# Patient Record
Sex: FEMALE | Race: WHITE | URBAN - METROPOLITAN AREA
[De-identification: names, ages, dates, MRNs, and addresses within clinical notes are randomized per-mention and may not be internally consistent; named-entity substitution may affect disease eponyms.]

---

## 2021-03-18 ENCOUNTER — IMMUNIZATION (OUTPATIENT)
Dept: PRIMARY CARE CLINIC | Age: 56
End: 2021-03-18
Payer: MEDICAID

## 2021-03-18 PROCEDURE — 91301 COVID-19, MODERNA VACCINE 100MCG/0.5ML DOSE: CPT | Performed by: NURSE PRACTITIONER

## 2021-03-18 PROCEDURE — 0011A COVID-19, MODERNA VACCINE 100MCG/0.5ML DOSE: CPT | Performed by: NURSE PRACTITIONER

## 2021-04-15 ENCOUNTER — IMMUNIZATION (OUTPATIENT)
Dept: PRIMARY CARE CLINIC | Age: 56
End: 2021-04-15
Payer: MEDICAID

## 2021-04-15 PROCEDURE — 0012A COVID-19, MODERNA VACCINE 100MCG/0.5ML DOSE: CPT | Performed by: NURSE PRACTITIONER

## 2021-04-15 PROCEDURE — 91301 COVID-19, MODERNA VACCINE 100MCG/0.5ML DOSE: CPT | Performed by: NURSE PRACTITIONER

## 2022-01-05 ENCOUNTER — IMMUNIZATION (OUTPATIENT)
Dept: PRIMARY CARE CLINIC | Age: 57
End: 2022-01-05
Payer: MEDICAID

## 2022-01-05 PROCEDURE — 0064A COVID-19, MODERNA BOOSTER VACCINE 0.25ML DOSE: CPT | Performed by: PHYSICIAN ASSISTANT

## 2022-01-05 PROCEDURE — 91306 COVID-19, MODERNA BOOSTER VACCINE 0.25ML DOSE: CPT | Performed by: PHYSICIAN ASSISTANT

## 2022-12-09 ENCOUNTER — TELEPHONE (OUTPATIENT)
Dept: CASE MANAGEMENT | Age: 57
End: 2022-12-09

## 2022-12-09 NOTE — TELEPHONE ENCOUNTER
I called the patient and she confirmed her CT lung screening at [address] [place] [address] on 12/11/2022 at 8:00 am.  I reminded the patient to arrive at 7:30 am, enter through the main entrance, and register. Patient confirmed.         Electronically signed by Reggie Palmer on 12/9/22 at 3:36 PM EST

## 2022-12-11 ENCOUNTER — HOSPITAL ENCOUNTER (OUTPATIENT)
Dept: CT IMAGING | Age: 57
Discharge: HOME | End: 2022-12-11
Payer: MEDICAID

## 2022-12-11 DIAGNOSIS — Z87.891 PERSONAL HISTORY OF TOBACCO USE: ICD-10-CM

## 2022-12-11 PROCEDURE — 71271 CT THORAX LUNG CANCER SCR C-: CPT

## 2022-12-13 ENCOUNTER — TELEPHONE (OUTPATIENT)
Dept: GENERAL RADIOLOGY | Age: 57
End: 2022-12-13

## 2022-12-13 NOTE — TELEPHONE ENCOUNTER
No call, encounter opened to process CT Lung Screening. CT Lung Screen: 12/11/2022    Impression   Bilateral 2 mm pulmonary nodules. See below recommendations for   cross-sectional imaging follow-up per lung rads criteria. Asymmetric enlargement of the left lobe of the thyroid gland, raise the   possibility of a in isodense nodule. Correlation with thyroid ultrasound is   recommended. Bilateral top-normal sized axillary lymph nodes measuring up to 8 mm in short   axis. Clinical correlation recommended. Correlation with mammography is   also recommended. LUNG RADS:   Per ACR Lung-RADS Version 1.1       Category 2, Benign appearance or behavior. Management:  Continue annual lung screening with LDCT in 12 months. RECOMMENDATIONS:   If you would like to [facility] your patient [facility], please contact the Nurse Navigator at   [phone]. Pack years: 27    Social History     Tobacco Use  Smoking Status: Current Every Day Smoker    Start Date:    Quit Date:    Types: Cigarettes   Packs/Day: 1   Years: 30   Pack Years: 27   Smokeless Tobacco: Never         Results letter sent to patient via my chart or mailed.      [facility]

## 2023-01-10 PROBLEM — Z87.891 PERSONAL HISTORY OF TOBACCO USE: Status: ACTIVE | Noted: 2023-01-10

## 2023-01-10 PROBLEM — E04.1 THYROID NODULE: Status: ACTIVE | Noted: 2023-01-10

## 2023-01-18 ENCOUNTER — HOSPITAL ENCOUNTER (OUTPATIENT)
Dept: ULTRASOUND IMAGING | Age: 58
Discharge: HOME | End: 2023-01-18
Payer: MEDICAID

## 2023-01-18 DIAGNOSIS — E04.1 THYROID NODULE: ICD-10-CM

## 2023-01-18 PROCEDURE — 88305 TISSUE EXAM BY PATHOLOGIST: CPT

## 2023-01-18 PROCEDURE — 10005 FNA BX W/US GDN 1ST LES: CPT

## 2023-01-18 PROCEDURE — 88173 CYTOPATH EVAL FNA REPORT: CPT

## 2023-01-20 ENCOUNTER — HOSPITAL ENCOUNTER (OUTPATIENT)
Dept: MAMMOGRAPHY | Age: 58
Discharge: HOME | End: 2023-01-20
Payer: MEDICAID

## 2023-01-20 DIAGNOSIS — Z12.31 ENCOUNTER FOR SCREENING MAMMOGRAM FOR BREAST CANCER: ICD-10-CM

## 2023-01-20 PROCEDURE — 77067 SCR MAMMO BI INCL CAD: CPT

## 2023-01-30 NOTE — PROGRESS NOTES
Dear Dr Bernadine Sorensen, [place], [place]     We had the pleasure of seeing Wojciech Andrew, 1965 here    on 2/2/2023  Please see below for review of care and plans. Chief complaint-     ICD-10-CM    1. 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 First noted on a CT lung on 12/11/22. Thyroid US and FNA in chart 12/15/22 and 1/18/23. No pain, just has soreness in neck since biopsy and tenderness in Rt shoulder. Mentions coughing up a lot of mucous and has frequent Lt sided sinus pressure and wooshing in head/ears. No difficulty swallowing. Froggy voice and seasonally loses voice. Drinks water throughout the day, 2 cups of coffee, and occasionally alcohol. Weight has been stable recently, but lost 33 lbs since July with diet change. - hx of radiation exposure. + family hx of thyroid disease- cousin with benign, no cancers in family      Review of Systems- No drainage, discharge, or headache. Complete 10 system ROS completed and negative except as noted above. Physical Examination-   Vital Signs-/81   Pulse 68   Ht 5' 3\" (1.6 m)   Wt 182 lb 9.6 oz (82.8 kg)   BMI 32.35 kg/m²     Ears- Tympanic membranes clear bilaterally. No middle ear effusion. No pre or post auricular tenderness. Nose- Nasal mucosa clear and dry. No significant septal deviation or inferior turbinate hypertrophy. Oral Cavity/Oropharynx- Floor of mouth and tongue are soft and nontender. No posterior pharyngeal erythema. + gag reflex  Neck- Soft and nontender. No masses, lesions, lymphadenopathy, or thyroid nodules appreciated. Cranial Nerve- Cranial nerves II to XII intact. Extraocular muscles intact. No gross motor visual deficits. No spontaneous nystagmus. Face- No facial skin tenderness to palpation. Heart- No cyanosis, regular  Lungs- No stridor, no intercostal accessory muscle use  General- The patient is in no acute distress. A&O x3    Scope  Procedure note- after pt verbally consented, was sprayed nasally with 1:1 neosynephrine and xylocaine. Scope was passed and found nasal cavity with no lesion. np clear, tonsil wnl, tongue mobile and no masses, vocal cords mobile bilaterally with full ab and ad duction except for a round polyp looking lesion on left ant third of vocal cord true. Hypopharynx clear, open and no masses. Medical Decision Making and Treatment Plan. Plan at this time for her thyroid disease was to   1. D/w pt thyroid anatomy, disease and cancer   2. D/w pt treatment options of thyroidectomy    3. R/B/A of surgery discussed with pt. Pt understood, consent signed, and would like to proceed to surgery. No personal or family history of bleeding or adverse reaction to anesthesia. 4.   Plan at this time to treat vocal cord nodule   1. Speech therapy   2. Instructed on good voice use   3. Scope in office- done   4. Recommend excision in OR at time of thyroidectomy      I spent 65 minutes with the patients care and >50% of this time on counseling or coordinating care in this cancer patient who needs major cancer surgery        Thank you for the opportunity to take part in the care of this very pleasant patient, Nichelle Burns  Sincerely,            Reymundo Montemayor.  Erik Qureshi M.D., Ph.D., [address] [facility]   Department of Otolaryngology-Head and Neck Surgery  Chief of Head & Neck Surgical Oncology  Director Head & Neck Oncology Service Line

## 2023-02-02 ENCOUNTER — OFFICE VISIT (OUTPATIENT)
Dept: ENT CLINIC | Age: 58
End: 2023-02-02
Payer: MEDICAID

## 2023-02-02 VITALS
DIASTOLIC BLOOD PRESSURE: 81 MMHG | SYSTOLIC BLOOD PRESSURE: 127 MMHG | HEART RATE: 68 BPM | HEIGHT: 63 IN | WEIGHT: 182.6 LBS | BODY MASS INDEX: 32.36 KG/M2

## 2023-02-02 DIAGNOSIS — Q31.8 VOCAL CORD ANOMALY: ICD-10-CM

## 2023-02-02 DIAGNOSIS — R49.0 HOARSENESS OF VOICE: ICD-10-CM

## 2023-02-02 DIAGNOSIS — J38.1 VOCAL CORD POLYP: ICD-10-CM

## 2023-02-02 DIAGNOSIS — C73 THYROID CANCER (HCC): Primary | ICD-10-CM

## 2023-02-02 PROCEDURE — 99205 OFFICE O/P NEW HI 60 MIN: CPT | Performed by: OTOLARYNGOLOGY

## 2023-02-02 PROCEDURE — 31575 DIAGNOSTIC LARYNGOSCOPY: CPT | Performed by: OTOLARYNGOLOGY

## 2023-02-02 PROCEDURE — 3017F COLORECTAL CA SCREEN DOC REV: CPT | Performed by: OTOLARYNGOLOGY

## 2023-02-02 PROCEDURE — 4004F PT TOBACCO SCREEN RCVD TLK: CPT | Performed by: OTOLARYNGOLOGY

## 2023-02-02 PROCEDURE — G8482 FLU IMMUNIZE ORDER/ADMIN: HCPCS | Performed by: OTOLARYNGOLOGY

## 2023-02-02 PROCEDURE — G8417 CALC BMI ABV UP PARAM F/U: HCPCS | Performed by: OTOLARYNGOLOGY

## 2023-02-02 PROCEDURE — G8427 DOCREV CUR MEDS BY ELIG CLIN: HCPCS | Performed by: OTOLARYNGOLOGY

## 2023-02-02 NOTE — PATIENT INSTRUCTIONS
Calcium 500 mg.   2 tablets 3 times a day for the first week. 2 tablets 2 times a day for the next week then   2 tablets 1 time a day for the last week    Post thyroid/parathyroid surgery instructions:      Calcium Vit D   2 tabs 3 times a day for 1 week                            2 tabs twice a day for 1 week                            2 tabs once a day for 1 week    Can be a tablet or a chocolate chew     SURGERY:_____/_____/_____    Nothing to eat or drink after midnight the night before surgery unless surgery is at [address] [place] [address] or otherwise instructed by the hospital.    DO NOT TAKE ANY ASPIRIN PRODUCTS 7 days prior to surgery. Tylenol only. No Advil, Motrin, Aleve, or Ibuprofen. IF YOU ARE ON BLOOD THINNERS (PLAVIX, COUMADIN, ELIQUIS ETC) THESE WILL ALSO NEED TO BE HELD. Any illegal drugs in your system (including Marijuana even if legally prescribed) will result in your surgery being cancelled. Please be sure to check with our office or the hospital on time frame for the drugs to be out of your system. SHOULD YOUR INSURANCE CHANGE AT ANY TIME YOU MUST CONTACT OUR OFFICE. FAILURE TO DO SO MAY RESULT IN YOUR SURGERY BEING RESCHEDULED OR YOU MAY BE CHARGED AS SELF-PAY. Due to the high demand for surgery at our practice, if you cancel or reschedule your surgery two (2) times we may not reschedule you. If you need FMLA or Short Term Disability paperwork completed for your surgery, please complete your portion, ensure your name and date of birth are on them and fax them to [phone] asap. Paperwork can take up to 2 weeks to be completed. If you have any questions or concerns regarding your surgery, please contact the Surgery SchedulerJuan Jose Peñaloza at [phone] option 2. If you need medical clearance, you are responsible to contact your physician(s) to schedule an appointment for clearance. If clearance is not completed within 30 days of your surgery it may be cancelled.  Our office will fax the appropriate forms that need to be completed to your physician(s). The location of your surgery will call you the day prior to your surgery date to let you know what time you have to be there and any other details. (they usually don't call until late afternoon- early evening.)- IF YOU HAVE QUESTIONS REGARDING THE TIME OF YOUR SURGERY, PLEASE CALL THE FACILITY YOU ARE SCHEDULED AT. [address] [facility], [facility] [place] will call you a couple days prior to surgery and give you further instructions, if you have any questions, you can reach them at [phone] (per Pre-Admission testing, EKG is required for all patients age 53+, have a diagnosis of hypertension, diabetes, or on dialysis). [facility] [address], [place] will call you a couple days prior to surgery and give you further instructions, if you have any questions, you can reach them at [phone] (per Pre-Admission testing, EKG is required for all patients age 53+, have a diagnosis of hypertension, diabetes, or on dialysis). [address] [facility] [place]  will call you a couple days prior to surgery and give you further instructions, if you have any questions, you can reach them at [phone] (per Pre-Admission testing, EKG is required for all patients age 53+, have a diagnosis of hypertension, diabetes, or on dialysis). [facility]), [address],  [facility], [address] will call you a couple days prior to surgery and give you further instructions, if you have any questions, you can reach them at [phone]      Pre-Surgery/Anesthesia Video ([facility] ONLY)  Located on [address]:  1. Scroll over Health Information  2. Select Audio and Video  3. Select [employer]  4. Select Your child and Anesthesia  5.  Select Pre surgery Tour-[place]    FOOD RESTRICTIONS--[facility] ONLY  Solid Food/Milk Products --------- Stop 8 hours prior to Surgery  Formula --------- Stop 6 hours prior to Surgery  Breast Milk ------- Stop 4 hours prior to Surgery  Clear liquids (water, Gatorade, Pedialyte) - Stop 2 hours prior to Surgery

## 2023-02-02 NOTE — LETTER
Dear Dr Royer Collier, DO      We had the pleasure of seeing Gena Crews, 1965 here    on 2/2/2023  Please see below for review of care and plans. Chief complaint-     ICD-10-CM    1. 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 First noted on a CT lung on 12/11/22. Thyroid US and FNA in chart 12/15/22 and 1/18/23. No pain, just has soreness in neck since biopsy and tenderness in Rt shoulder. Mentions coughing up a lot of mucous and has frequent Lt sided sinus pressure and wooshing in head/ears. No difficulty swallowing. Froggy voice and seasonally loses voice. Drinks water throughout the day, 2 cups of coffee, and occasionally alcohol. Weight has been stable recently, but lost 33 lbs since July with diet change. - hx of radiation exposure. + family hx of thyroid disease- cousin with benign, no cancers in family      Review of Systems- No drainage, discharge, or headache. Complete 10 system ROS completed and negative except as noted above. Physical Examination-   Vital Signs-/81   Pulse 68   Ht 5' 3\" (1.6 m)   Wt 182 lb 9.6 oz (82.8 kg)   BMI 32.35 kg/m²     Ears- Tympanic membranes clear bilaterally. No middle ear effusion. No pre or post auricular tenderness. Nose- Nasal mucosa clear and dry. No significant septal deviation or inferior turbinate hypertrophy. Oral Cavity/Oropharynx- Floor of mouth and tongue are soft and nontender. No posterior pharyngeal erythema. + gag reflex  Neck- Soft and nontender. No masses, lesions, lymphadenopathy, or thyroid nodules appreciated. Cranial Nerve- Cranial nerves II to XII intact. Extraocular muscles intact. No gross motor visual deficits. No spontaneous nystagmus.     Face- No facial skin tenderness to palpation. Heart- No cyanosis, regular  Lungs- No stridor, no intercostal accessory muscle use  General- The patient is in no acute distress. A&O x3    Scope  Procedure note- after pt verbally consented, was sprayed nasally with 1:1 neosynephrine and xylocaine. Scope was passed and found nasal cavity with no lesion. np clear, tonsil wnl, tongue mobile and no masses, vocal cords mobile bilaterally with full ab and ad duction except for a round polyp looking lesion on left ant third of vocal cord true. Hypopharynx clear, open and no masses. Medical Decision Making and Treatment Plan. Plan at this time for her thyroid disease was to   1. D/w pt thyroid anatomy, disease and cancer   2. D/w pt treatment options of thyroidectomy    3. R/B/A of surgery discussed with pt. Pt understood, consent signed, and would like to proceed to surgery. No personal or family history of bleeding or adverse reaction to anesthesia. 4.   Plan at this time to treat vocal cord nodule   1. Speech therapy   2. Instructed on good voice use   3. Scope in office- done   4. Recommend excision in OR at time of thyroidectomy      I spent 65 minutes with the patients care and >50% of this time on counseling or coordinating care in this cancer patient who needs major cancer surgery        Thank you for the opportunity to take part in the care of this very pleasant patient, Nikkoines Villa  Sincerely,            Irvin Kerr.  Marco Zapata M.D., Ph.D., [address] [facility]   Department of Otolaryngology-Head and Neck Surgery  Chief of Head & Neck Surgical Oncology  Director Head & Neck Oncology Service Line

## 2023-02-10 ENCOUNTER — TELEPHONE (OUTPATIENT)
Dept: ENT CLINIC | Age: 58
End: 2023-02-10

## 2023-02-10 NOTE — TELEPHONE ENCOUNTER
Pt called and left a message that she wanted to talk to someone about surgery can call her back [phone]

## 2023-02-10 NOTE — TELEPHONE ENCOUNTER
Called her back and informed er that they will call her with a surgery date and then tell you when to start calcium

## 2023-02-16 ENCOUNTER — EVALUATION (OUTPATIENT)
Dept: SPEECH THERAPY | Age: 58
End: 2023-02-16
Payer: MEDICAID

## 2023-02-16 DIAGNOSIS — R49.0 HOARSENESS: Primary | ICD-10-CM

## 2023-02-16 PROCEDURE — 92524 BEHAVRAL QUALIT ANALYS VOICE: CPT | Performed by: SPEECH-LANGUAGE PATHOLOGIST

## 2023-02-24 ENCOUNTER — TREATMENT (OUTPATIENT)
Dept: SPEECH THERAPY | Age: 58
End: 2023-02-24
Payer: MEDICAID

## 2023-02-24 ENCOUNTER — PREP FOR PROCEDURE (OUTPATIENT)
Dept: ENT CLINIC | Age: 58
End: 2023-02-24

## 2023-02-24 ENCOUNTER — TELEPHONE (OUTPATIENT)
Dept: ENT CLINIC | Age: 58
End: 2023-02-24

## 2023-02-24 DIAGNOSIS — R49.0 HOARSENESS: Primary | ICD-10-CM

## 2023-02-24 PROCEDURE — 92507 TX SP LANG VOICE COMM INDIV: CPT | Performed by: SPEECH-LANGUAGE PATHOLOGIST

## 2023-02-24 NOTE — TELEPHONE ENCOUNTER
Prior Authorization Form:      DEMOGRAPHICS:                     Patient Name:  Nichelle Burns  Patient :  1965            Insurance:  Payor: Siomara Fat / Plan: Ramona Hu / Product Type: *No Product type* /   Insurance ID Number:    Payer/Plan Subscr  Sex Relation Sub. Ins. ID Effective Group Num   1.  [facility] 1965 Female Self [id number]                                   [address] [address]         DIAGNOSIS & PROCEDURE:                       Procedure/Operation:  VOCAL CORD POLYP EXCISION, TOTAL THYROIDECTOMY,           CPT Code: 56939, 51181    Diagnosis:  THYROID CANCER, VOCAL CORD POLYP, THYROID NODULE    ICD10 Code: O90, E04.1, J38.1    Location:  [facility] [place] [facility]    Surgeon:  DR. Elmore Part INFORMATION:                          Date: 3/13/23    Time: N/A              Anesthesia:  General                                                       Status:  Outpatient        Special Comments:  NERVE INTEGRITY, MONITOR, PATHOLOGY      Electronically signed by Daysi Newman MA on 2023 at 11:22 AM

## 2023-03-01 NOTE — PROGRESS NOTES
Patient seen for 30 minute in person session this date. Patient reports that she is having her surgery on 03/13/23. We discussed need to change her vocally abusive behaviors so that the nodule does not return after surgery. She expressed understanding. Today, she reports that she has increased her water intake and is still no longer smoking. We worked on all vocal function exercises and after practice, she was able to complete the tasks with fair/fair+ performance with min/mod cues. She was instructed on the initial tasks for easy onset speech in CV combos and words only. She was able to demonstrate understanding and a practice schedule as discussed and agreed upon. Will continue. Guy Horne.  Kirt Shelby MA/CCC-SLP  [id number]  CPT 40551

## 2023-03-01 NOTE — PROGRESS NOTES
[facility]  Outpatient Speech Therapy  Phone: [phone]   Fax:  [phone]    SPEECH/LANGUAGE PATHOLOGY  VOICE EVALUATION and PLAN OF CARE          PATIENT NAME:  Jeanine Segovia  (female)     MRN:  [id number]    :  1965  (61 y.o.)  STATUS:  Outpatient clinic   TODAY'S DATE: 2023  REFERRING PROVIDER:    Dr. Len Pavon       PROVIDER NPI:  [id number]  SPECIFIC PROVIDER ORDER: [facility]-Speech Therapy  Date of order:  2023  EVALUATING THERAPIST: KEENA Wheatley    CERTIFICATION/RECERTIFICATION PERIOD: 2023 to 5/15/23  INSURANCE PROVIDER:  Payor: Norman Palmer / Plan: Melinda Wilkinson / Product Type: *No Product type* /    INSURANCE ID:  [id number] - (Medicaid Managed)   SECONDARY INSURANCE (if applicable):      CPT Codes  EVALUATION: 74043  behavioral quality analysis of voice      TREATMENT:  Requesting treatment authorization for  12 visits over 12 weeks focusing on the following CPT codes:      66027 Speech/Language Therapy     30-45 Minutes    REFERRING/TREATMENT DIAGNOSIS: Vocal cord polyp [J38.1]; Hoarseness of voice [R49.0]       SPEECH THERAPY  PLAN OF CARE   The speech therapy  POC is established based on physician order, speech pathology diagnosis and results of clinical assessment     SPEECH PATHOLOGY DIAGNOSIS:    mild-moderate voice disorder    Outpatient Speech Pathology intervention is recommended 1 time  per week for the above certification period. Conditions Requiring Skilled Therapeutic Intervention for voice  Voice impairment    Specific Speech Therapy Interventions to Include:   Voice therapy    Specific instructions for next treatment:      To initiate POC  To initiate therapeutic exercises    SHORT/LONG TERM GOALS   Patient will complete vocal function exercises (maximum sustained phonation, pitch glides, etc) to achieve appropriate and habitual phonatory function achieving 90% accuracy with minimal verbal prompts. Patient will maintain good vocal hygiene and demonstrate good understanding of behaviors that are damaging to the voice by parent and SLP report with 90% accuracy. Patient will eliminate vocal overuse to improve health of vocal folds by reducing or eliminating trauma to vocal tissues within 4 weeks as evidenced by patient report and SLP observations with 100% accuracy  Patient will complete breath support exercises (abdominal breathing, expiratory muscle training, etc) to improve respiratory functional with voicing achieving 90% accuracy with minimal verbal prompts. Patient will independently use easy onset/yawn sigh techniques to maintain production of voice without hard glottal attacks and in order to reduce tension on the vocal folds. Patient stated goals: Agreed with above,     Rehabilitation Potential/Prognosis: good     VOICE INFORMATION:    Difficulties reported: hoarseness; Clark Triana saw a doctor for a physical recently. She was found to have a vocal cord nodule. She also had a thyroid biopsy and was diagnosed with thyroid cancer. She is scheduled soon for surgery for thyroid removal.  At the time of this surgery, plans are to have the nodule removed as well. She reports medium to high stress levels. 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 Date of initial voice problem:  approx 1 year - she reports that she has had the hoarseness for this time period but was not diagnosed with the nodule until she saw the ENT specialist on 02/02/2023. He diagnosed a round polyp looking lesion on the left anterior third of the true vocal cord.      Vocally abusive behaviors reported:  Throat Clearing, Coughing , Drinking coffee, drinking soda with caffeine, history of smoking but recently quit cold turkey at time of cancer diagnosis, Excessive talking, occasional intake of alcohol, frequent heartburn, and Allergies      Vocally abusive environments exposed to: Dry air and Noisy rooms      Injuries or operations involving the face, head, neck or chest: none    ORAL PERIPHERAL EXAMINATION:      Adequate lingual/labial strength   Dentition:  natural     Tension sites: face and neck    RESPIRATION:   Type of breathing observed:  [] clavicular [x] thoracic [] abdominal      Breath control:    Counts on 1 breath: 18    Sustains /s/: 8.19 seconds    Sustains /z/: 8.54 seconds    S/Z ratio: .86    Sustains /ee/: 8.36 seconds    Sustains /ah/: 8.17 seconds    VOICE PARAMETERS:   PITCH    Pitch Discrimination: fair    Pitch matching:  fair    Overall pitch:  fair    Vocal inflection: fair    Pitch breaks:  fair    INTENSITY    Vocal intensity:  normal    RATE  Speech rate: normal     QUALITY    Vocal quality: breathy and hoarse    RESONANCE    Nasal resonance:   normal       EDUCATION:   The Speech Language Pathologist (SLP) completed education regarding results of evaluation and that intervention is warranted at this time. Learner: Patient  Education: Reviewed results and recommendations of this evaluation  Evaluation of Education:  Sandy Leo understanding    This plan may be re-evaluated and revised as warranted. Treatment goals discussed with Patient   The Patient understand(s) the diagnosis, prognosis and plan of care     Evaluation Time includes thorough review of current medical information, gathering information on past medical history/social history and prior level of function, completion of standardized testing/informal observation of tasks, assessment of data and education on plan of care and goals. The admitting diagnosis and active problem list, as listed below have been reviewed prior to initiation of this evaluation. ACTIVE PROBLEM LIST:   Patient Active Problem List   Diagnosis    Thyroid nodule    Personal history of tobacco use    Thyroid cancer ([facility] [address]    Vocal cord polyp    Hoarseness of voice    Vocal cord anomaly       Jesse Han.  Rosamond Riedel, MA/CCC-SLP  [facility].           Phone: [phone]       If you have any questions or concerns, please don't hesitate to call. Thank you for your referral.    Physician/Provider Signature:________________________________Date:__________________  By signing above, the therapists plan is approved by the physician/provider. All patients under [employer] must be signed by physician/provider. [x]Fall risk assessment completed  [x]The admitting diagnosis and active problem list, as listed below have been reviewed prior to initiation of this evaluation. ADMITTING DIAGNOSIS: Vocal cord polyp [J38.1]; Hoarseness of voice [R49.0]     ACTIVE PROBLEM LIST:   Patient Active Problem List   Diagnosis    Thyroid nodule    Personal history of tobacco use    Thyroid cancer ([facility] [address]    Vocal cord polyp    Hoarseness of voice    Vocal cord anomaly

## 2023-03-03 ENCOUNTER — TREATMENT (OUTPATIENT)
Dept: SPEECH THERAPY | Age: 58
End: 2023-03-03
Payer: MEDICAID

## 2023-03-03 DIAGNOSIS — R49.0 HOARSENESS: Primary | ICD-10-CM

## 2023-03-03 PROCEDURE — 92507 TX SP LANG VOICE COMM INDIV: CPT | Performed by: SPEECH-LANGUAGE PATHOLOGIST

## 2023-03-07 NOTE — PROGRESS NOTES
[address]   PRE-ADMISSION TESTING GENERAL INSTRUCTIONS  [facility] Phone Number: [phone]      GENERAL INSTRUCTIONS:    [x] Antibacterial Soap Shower Night before and/or AM of surgery. [] CHG Wipes instruction sheet and wipes given. [] Hibiclens shower the night before and the morning of surgery.  -Do not use Hibiclens on your face or head. [x] Do not wear makeup, lotions, powders, deodorant. [x] Nothing by mouth after midnight; including gum, candy, mints, or water. [x] You may brush your teeth, gargle, but do NOT swallow water. [x] No tobacco products, illegal drugs, or alcohol within 24 hours of your surgery. [x] Jewelry or valuables should not be brought to the hospital. All body and/or tongue piercing's must be removed prior to arriving to hospital. No contact lens or hair pins. [x] Arrange transportation with a responsible adult  to and from the hospital. Arrange for someone to be with you for the remainder of the day and for 24 hours after your procedure due to having had anesthesia. -Who will be your  for transportation? - Tim Kirby        -Who will be staying with you for 24 hrs after your procedure? - Tim Kirby  [x] [employer] card and photo ID. [x] Bring copy of living will or healthcare power of  papers to be placed in your electronic record. [] Urine Pregnancy test will be preformed the day of surgery. A specimen sample may be brought from home. [x] Transfusion Bracelet: Please bring with you to hospital, day of surgery. PARKING INSTRUCTIONS:    [x] ARRIVAL DATE & TIME: 3/13/23 @ 5:30AM  [x] Enter into the [employer]. Two people may accompany you. Masks are not required but are recommended. [x] Parking Lot \"I\" is where you will park. It is located on the corner of [address] and [address] [facility]. The entrance is on [address]. Upon entering the parking lot, a voucher ticket will print.     EDUCATION INSTRUCTIONS:        [] Knee or Hip replacement booklet & exercise pamphlets given. [] [address] placed in chart. [] Pre-admission Testing educational folder given  [] Incentive Spirometry,coughing & deep breathing exercises reviewed. [] Medication information sheet(s)   [] Fluoroscopy-Xray used in surgery reviewed with patient. Educational pamphlet placed in chart. [x] Pain: Post-op pain is normal and to be expected. You will be asked to rate your pain from 0-10. [] Joint camp offered. [] Joint replacement booklets given.  [] Spine Navigator to see in PAT. [] Other:___________________________    MEDICATION INSTRUCTIONS:    [x] Bring a complete list of your medications, please write the last time you took the medicine, give this list to the nurse in Pre-Op. [x] Take only the following medications the morning of surgery with 1-2 ounces of water: NONE. [x] Stop all herbal supplements and vitamins 5 days before surgery. Stop NSAIDS 7 days before surgery. [] DO NOT take any diabetic medicine the morning of surgery. Follow instructions for insulin the day before surgery. [] If you are diabetic and your blood sugar is low or you feel symptomatic, you may drink 1-2 ounces of apple juice or take a glucose tablet.            -The morning of your procedure, you may call the pre-op area if you have concerns about your blood sugar [phone]. [x] Use your inhalers the morning of surgery. Bring your emergency inhaler with you day of surgery. [x] Follow physician instructions regarding any blood thinners you may be taking. WHAT TO EXPECT:    [x] The day of surgery you will be greeted and checked in by the [employer].  In addition, you will be registered in the [place] by a Patient Access Representative. Please bring your photo ID and insurance card.  A nurse will greet you in accordance to the time you are needed in the pre-op area to prepare you for surgery. Please do not be discouraged if you are not greeted in the order you arrive as there are many variables that are involved in patient preparation. Your patience is greatly appreciated as you wait for your nurse. Please bring in items such as: books, magazines, newspapers, electronics, or any other items  to occupy your time in the waiting area. [x]  Delays may occur with surgery and staff will make a sincere effort to keep you informed of delays. If any delays occur with your procedure, we apologize ahead of time for your inconvenience as we recognize the value of your time.

## 2023-03-10 ENCOUNTER — TREATMENT (OUTPATIENT)
Dept: SPEECH THERAPY | Age: 58
End: 2023-03-10
Payer: MEDICAID

## 2023-03-10 DIAGNOSIS — R49.0 HOARSENESS: Primary | ICD-10-CM

## 2023-03-10 PROCEDURE — 92507 TX SP LANG VOICE COMM INDIV: CPT | Performed by: SPEECH-LANGUAGE PATHOLOGIST

## 2023-03-11 ENCOUNTER — ANESTHESIA EVENT (OUTPATIENT)
Dept: OPERATING ROOM | Age: 58
End: 2023-03-11
Payer: MEDICAID

## 2023-03-12 NOTE — H&P
Chief complaint-       ICD-10-CM     1. Thyroid cancer ([facility] [address]  C73         2. 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 First noted on a CT lung on 12/11/22. Thyroid US and FNA in chart 12/15/22 and 1/18/23. No pain, just has soreness in neck since biopsy and tenderness in Rt shoulder. Mentions coughing up a lot of mucous and has frequent Lt sided sinus pressure and wooshing in head/ears. No difficulty swallowing. Froggy voice and seasonally loses voice. Drinks water throughout the day, 2 cups of coffee, and occasionally alcohol. Weight has been stable recently, but lost 33 lbs since July with diet change. - hx of radiation exposure. + family hx of thyroid disease- cousin with benign, no cancers in family     Family hx: per HPI  Meds: per chart review  Allergies: seasonal     Review of Systems- No drainage, discharge, or headache. Complete 10 system ROS completed and negative except as noted above. Physical Examination-   Vital Signs-/81   Pulse 68   Ht 5' 3\" (1.6 m)   Wt 182 lb 9.6 oz (82.8 kg)   BMI 32.35 kg/m²     Ears- Tympanic membranes clear bilaterally. No middle ear effusion. No pre or post auricular tenderness. Nose- Nasal mucosa clear and dry. No significant septal deviation or inferior turbinate hypertrophy. Oral Cavity/Oropharynx- Floor of mouth and tongue are soft and nontender. No posterior pharyngeal erythema. + gag reflex  Neck- Soft and nontender. No masses, lesions, lymphadenopathy, or thyroid nodules appreciated. Cranial Nerve- Cranial nerves II to XII intact. Extraocular muscles intact. No gross motor visual deficits. No spontaneous nystagmus. Face- No facial skin tenderness to palpation.   Heart- No cyanosis, regular  Lungs- No stridor, no intercostal accessory muscle use  General- The patient is in no acute distress. A&O x3     Scope  Procedure note- after pt verbally consented, was sprayed nasally with 1:1 neosynephrine and xylocaine. Scope was passed and found nasal cavity with no lesion. np clear, tonsil wnl, tongue mobile and no masses, vocal cords mobile bilaterally with full ab and ad duction except for a round polyp looking lesion on left ant third of vocal cord true. Hypopharynx clear, open and no masses. Medical Decision Making and Treatment Plan. Plan at this time for her thyroid disease was to   1. D/w pt thyroid anatomy, disease and cancer   2. D/w pt treatment options of thyroidectomy    3. R/B/A of surgery discussed with pt. Pt understood, consent signed, and would like to proceed to surgery. No personal or family history of bleeding or adverse reaction to anesthesia. 4.   Plan at this time to treat vocal cord nodule   1. Speech therapy   2. Instructed on good voice use   3. Scope in office- done   4.  Recommend excision in OR at time of thyroidectomy

## 2023-03-13 ENCOUNTER — ANESTHESIA (OUTPATIENT)
Dept: OPERATING ROOM | Age: 58
End: 2023-03-13
Payer: MEDICAID

## 2023-03-13 ENCOUNTER — HOSPITAL ENCOUNTER (OUTPATIENT)
Age: 58
Setting detail: OUTPATIENT SURGERY
Discharge: HOME | End: 2023-03-13
Attending: OTOLARYNGOLOGY | Admitting: OTOLARYNGOLOGY
Payer: MEDICAID

## 2023-03-13 VITALS
HEART RATE: 75 BPM | TEMPERATURE: 97.2 F | HEIGHT: 63 IN | BODY MASS INDEX: 32.78 KG/M2 | RESPIRATION RATE: 16 BRPM | DIASTOLIC BLOOD PRESSURE: 81 MMHG | SYSTOLIC BLOOD PRESSURE: 128 MMHG | WEIGHT: 185 LBS | OXYGEN SATURATION: 98 %

## 2023-03-13 DIAGNOSIS — J38.1 POLYP OF VOCAL CORD: ICD-10-CM

## 2023-03-13 DIAGNOSIS — E04.1 THYROID NODULE: ICD-10-CM

## 2023-03-13 DIAGNOSIS — C73 THYROID CANCER (HCC): ICD-10-CM

## 2023-03-13 LAB — PARATHYROID HORMONE INTACT: 17 PG/ML (ref 15–65)

## 2023-03-13 PROCEDURE — 2580000003 HC RX 258: Performed by: OTOLARYNGOLOGY

## 2023-03-13 PROCEDURE — 7100000010 HC PHASE II RECOVERY - FIRST 15 MIN: Performed by: OTOLARYNGOLOGY

## 2023-03-13 PROCEDURE — 3600000014 HC SURGERY LEVEL 4 ADDTL 15MIN: Performed by: OTOLARYNGOLOGY

## 2023-03-13 PROCEDURE — 6370000000 HC RX 637 (ALT 250 FOR IP): Performed by: ANESTHESIOLOGY

## 2023-03-13 PROCEDURE — 7100000000 HC PACU RECOVERY - FIRST 15 MIN: Performed by: OTOLARYNGOLOGY

## 2023-03-13 PROCEDURE — 6370000000 HC RX 637 (ALT 250 FOR IP): Performed by: OTOLARYNGOLOGY

## 2023-03-13 PROCEDURE — 2720000010 HC SURG SUPPLY STERILE: Performed by: OTOLARYNGOLOGY

## 2023-03-13 PROCEDURE — 2709999900 HC NON-CHARGEABLE SUPPLY: Performed by: OTOLARYNGOLOGY

## 2023-03-13 PROCEDURE — 60240 REMOVAL OF THYROID: CPT | Performed by: OTOLARYNGOLOGY

## 2023-03-13 PROCEDURE — 3600000004 HC SURGERY LEVEL 4 BASE: Performed by: OTOLARYNGOLOGY

## 2023-03-13 PROCEDURE — 31541 LARYNSCOP W/TUMR EXC + SCOPE: CPT | Performed by: OTOLARYNGOLOGY

## 2023-03-13 PROCEDURE — 88305 TISSUE EXAM BY PATHOLOGIST: CPT

## 2023-03-13 PROCEDURE — 88307 TISSUE EXAM BY PATHOLOGIST: CPT

## 2023-03-13 PROCEDURE — 2500000003 HC RX 250 WO HCPCS

## 2023-03-13 PROCEDURE — 2500000003 HC RX 250 WO HCPCS: Performed by: OTOLARYNGOLOGY

## 2023-03-13 PROCEDURE — 7100000001 HC PACU RECOVERY - ADDTL 15 MIN: Performed by: OTOLARYNGOLOGY

## 2023-03-13 PROCEDURE — 3700000000 HC ANESTHESIA ATTENDED CARE: Performed by: OTOLARYNGOLOGY

## 2023-03-13 PROCEDURE — 31622 DX BRONCHOSCOPE/WASH: CPT | Performed by: OTOLARYNGOLOGY

## 2023-03-13 PROCEDURE — 7100000011 HC PHASE II RECOVERY - ADDTL 15 MIN: Performed by: OTOLARYNGOLOGY

## 2023-03-13 PROCEDURE — 36415 COLL VENOUS BLD VENIPUNCTURE: CPT

## 2023-03-13 PROCEDURE — 6360000002 HC RX W HCPCS: Performed by: OTOLARYNGOLOGY

## 2023-03-13 PROCEDURE — 83970 ASSAY OF PARATHORMONE: CPT

## 2023-03-13 PROCEDURE — 6360000002 HC RX W HCPCS

## 2023-03-13 PROCEDURE — 3700000001 HC ADD 15 MINUTES (ANESTHESIA): Performed by: OTOLARYNGOLOGY

## 2023-03-13 RX ORDER — MEPERIDINE HYDROCHLORIDE 25 MG/ML
12.5 INJECTION INTRAMUSCULAR; INTRAVENOUS; SUBCUTANEOUS EVERY 5 MIN PRN
Status: DISCONTINUED | OUTPATIENT
Start: 2023-03-13 | End: 2023-03-13 | Stop reason: HOSPADM

## 2023-03-13 RX ORDER — ROCURONIUM BROMIDE 10 MG/ML
INJECTION, SOLUTION INTRAVENOUS PRN
Status: DISCONTINUED | OUTPATIENT
Start: 2023-03-13 | End: 2023-03-13 | Stop reason: SDUPTHER

## 2023-03-13 RX ORDER — SODIUM CHLORIDE 0.9 % (FLUSH) 0.9 %
5-40 SYRINGE (ML) INJECTION EVERY 12 HOURS SCHEDULED
Status: DISCONTINUED | OUTPATIENT
Start: 2023-03-13 | End: 2023-03-13 | Stop reason: HOSPADM

## 2023-03-13 RX ORDER — KETAMINE HCL IN NACL, ISO-OSM 100MG/10ML
SYRINGE (ML) INJECTION PRN
Status: DISCONTINUED | OUTPATIENT
Start: 2023-03-13 | End: 2023-03-13 | Stop reason: SDUPTHER

## 2023-03-13 RX ORDER — EPINEPHRINE NASAL SOLUTION 1 MG/ML
SOLUTION NASAL PRN
Status: DISCONTINUED | OUTPATIENT
Start: 2023-03-13 | End: 2023-03-13 | Stop reason: ALTCHOICE

## 2023-03-13 RX ORDER — OXYCODONE HYDROCHLORIDE 5 MG/1
10 TABLET ORAL PRN
Status: COMPLETED | OUTPATIENT
Start: 2023-03-13 | End: 2023-03-13

## 2023-03-13 RX ORDER — ONDANSETRON 2 MG/ML
INJECTION INTRAMUSCULAR; INTRAVENOUS PRN
Status: DISCONTINUED | OUTPATIENT
Start: 2023-03-13 | End: 2023-03-13 | Stop reason: SDUPTHER

## 2023-03-13 RX ORDER — ONDANSETRON 4 MG/1
4 TABLET, FILM COATED ORAL 3 TIMES DAILY PRN
Qty: 15 TABLET | Refills: 0 | Status: SHIPPED | OUTPATIENT
Start: 2023-03-13

## 2023-03-13 RX ORDER — DIPHENHYDRAMINE HYDROCHLORIDE 50 MG/ML
12.5 INJECTION INTRAMUSCULAR; INTRAVENOUS
Status: DISCONTINUED | OUTPATIENT
Start: 2023-03-13 | End: 2023-03-13 | Stop reason: HOSPADM

## 2023-03-13 RX ORDER — LIDOCAINE HYDROCHLORIDE ANHYDROUS AND DEXTROSE MONOHYDRATE 5; 400 G/100ML; MG/100ML
INJECTION, SOLUTION INTRAVENOUS CONTINUOUS PRN
Status: DISCONTINUED | OUTPATIENT
Start: 2023-03-13 | End: 2023-03-13 | Stop reason: SDUPTHER

## 2023-03-13 RX ORDER — PROPOFOL 10 MG/ML
INJECTION, EMULSION INTRAVENOUS PRN
Status: DISCONTINUED | OUTPATIENT
Start: 2023-03-13 | End: 2023-03-13 | Stop reason: SDUPTHER

## 2023-03-13 RX ORDER — OXYCODONE HYDROCHLORIDE 5 MG/1
5 TABLET ORAL PRN
Status: COMPLETED | OUTPATIENT
Start: 2023-03-13 | End: 2023-03-13

## 2023-03-13 RX ORDER — SODIUM CHLORIDE 0.9 % (FLUSH) 0.9 %
5-40 SYRINGE (ML) INJECTION PRN
Status: DISCONTINUED | OUTPATIENT
Start: 2023-03-13 | End: 2023-03-13 | Stop reason: HOSPADM

## 2023-03-13 RX ORDER — MIDAZOLAM HYDROCHLORIDE 1 MG/ML
INJECTION INTRAMUSCULAR; INTRAVENOUS PRN
Status: DISCONTINUED | OUTPATIENT
Start: 2023-03-13 | End: 2023-03-13 | Stop reason: SDUPTHER

## 2023-03-13 RX ORDER — LEVOTHYROXINE SODIUM 0.12 MG/1
125 TABLET ORAL DAILY
Qty: 90 TABLET | Refills: 1 | Status: SHIPPED | OUTPATIENT
Start: 2023-03-13

## 2023-03-13 RX ORDER — FENTANYL CITRATE 50 UG/ML
INJECTION, SOLUTION INTRAMUSCULAR; INTRAVENOUS PRN
Status: DISCONTINUED | OUTPATIENT
Start: 2023-03-13 | End: 2023-03-13 | Stop reason: SDUPTHER

## 2023-03-13 RX ORDER — DEXAMETHASONE SODIUM PHOSPHATE 10 MG/ML
INJECTION INTRAMUSCULAR; INTRAVENOUS PRN
Status: DISCONTINUED | OUTPATIENT
Start: 2023-03-13 | End: 2023-03-13 | Stop reason: SDUPTHER

## 2023-03-13 RX ORDER — SUCCINYLCHOLINE/SOD CL,ISO/PF 200MG/10ML
SYRINGE (ML) INTRAVENOUS PRN
Status: DISCONTINUED | OUTPATIENT
Start: 2023-03-13 | End: 2023-03-13 | Stop reason: SDUPTHER

## 2023-03-13 RX ORDER — PROPOFOL 10 MG/ML
INJECTION, EMULSION INTRAVENOUS CONTINUOUS PRN
Status: DISCONTINUED | OUTPATIENT
Start: 2023-03-13 | End: 2023-03-13 | Stop reason: SDUPTHER

## 2023-03-13 RX ORDER — LIDOCAINE HYDROCHLORIDE 20 MG/ML
INJECTION, SOLUTION INTRAVENOUS PRN
Status: DISCONTINUED | OUTPATIENT
Start: 2023-03-13 | End: 2023-03-13 | Stop reason: SDUPTHER

## 2023-03-13 RX ORDER — HYDROCODONE BITARTRATE AND ACETAMINOPHEN 5; 325 MG/1; MG/1
1 TABLET ORAL EVERY 6 HOURS PRN
Qty: 12 TABLET | Refills: 0 | Status: SHIPPED | OUTPATIENT
Start: 2023-03-13 | End: 2023-03-16

## 2023-03-13 RX ORDER — SODIUM CHLORIDE 9 MG/ML
INJECTION, SOLUTION INTRAVENOUS PRN
Status: DISCONTINUED | OUTPATIENT
Start: 2023-03-13 | End: 2023-03-13 | Stop reason: HOSPADM

## 2023-03-13 RX ORDER — MAGNESIUM SULFATE HEPTAHYDRATE 500 MG/ML
INJECTION, SOLUTION INTRAMUSCULAR; INTRAVENOUS PRN
Status: DISCONTINUED | OUTPATIENT
Start: 2023-03-13 | End: 2023-03-13 | Stop reason: SDUPTHER

## 2023-03-13 RX ORDER — LIDOCAINE HYDROCHLORIDE AND EPINEPHRINE 10; 10 MG/ML; UG/ML
INJECTION, SOLUTION INFILTRATION; PERINEURAL PRN
Status: DISCONTINUED | OUTPATIENT
Start: 2023-03-13 | End: 2023-03-13 | Stop reason: ALTCHOICE

## 2023-03-13 RX ADMIN — SODIUM CHLORIDE 5 ML/HR: 9 INJECTION, SOLUTION INTRAVENOUS at 05:55

## 2023-03-13 RX ADMIN — FENTANYL CITRATE 50 MCG: 50 INJECTION, SOLUTION INTRAMUSCULAR; INTRAVENOUS at 07:55

## 2023-03-13 RX ADMIN — Medication 160 MG: at 07:45

## 2023-03-13 RX ADMIN — SUGAMMADEX 100 MG: 100 INJECTION, SOLUTION INTRAVENOUS at 09:42

## 2023-03-13 RX ADMIN — FENTANYL CITRATE 100 MCG: 50 INJECTION, SOLUTION INTRAMUSCULAR; INTRAVENOUS at 07:37

## 2023-03-13 RX ADMIN — MAGNESIUM SULFATE HEPTAHYDRATE 2 G: 500 INJECTION, SOLUTION INTRAMUSCULAR; INTRAVENOUS at 07:54

## 2023-03-13 RX ADMIN — ONDANSETRON 4 MG: 2 INJECTION INTRAMUSCULAR; INTRAVENOUS at 09:21

## 2023-03-13 RX ADMIN — PROPOFOL 150 MG: 10 INJECTION, EMULSION INTRAVENOUS at 07:37

## 2023-03-13 RX ADMIN — LIDOCAINE HYDROCHLORIDE 100 MG: 20 INJECTION, SOLUTION INTRAVENOUS at 07:37

## 2023-03-13 RX ADMIN — PROPOFOL 150 MCG/KG/MIN: 10 INJECTION, EMULSION INTRAVENOUS at 07:54

## 2023-03-13 RX ADMIN — DEXAMETHASONE SODIUM PHOSPHATE 10 MG: 10 INJECTION INTRAMUSCULAR; INTRAVENOUS at 07:53

## 2023-03-13 RX ADMIN — SODIUM CHLORIDE: 9 INJECTION, SOLUTION INTRAVENOUS at 09:18

## 2023-03-13 RX ADMIN — ROCURONIUM BROMIDE 30 MG: 10 INJECTION INTRAVENOUS at 07:52

## 2023-03-13 RX ADMIN — Medication 25 MG: at 07:37

## 2023-03-13 RX ADMIN — FENTANYL CITRATE 25 MCG: 50 INJECTION, SOLUTION INTRAMUSCULAR; INTRAVENOUS at 08:52

## 2023-03-13 RX ADMIN — SODIUM CHLORIDE 3000 MG: 9 INJECTION, SOLUTION INTRAVENOUS at 07:47

## 2023-03-13 RX ADMIN — MIDAZOLAM 2 MG: 1 INJECTION INTRAMUSCULAR; INTRAVENOUS at 07:33

## 2023-03-13 RX ADMIN — FENTANYL CITRATE 50 MCG: 50 INJECTION, SOLUTION INTRAMUSCULAR; INTRAVENOUS at 07:57

## 2023-03-13 RX ADMIN — FENTANYL CITRATE 25 MCG: 50 INJECTION, SOLUTION INTRAMUSCULAR; INTRAVENOUS at 09:49

## 2023-03-13 RX ADMIN — OXYCODONE 10 MG: 5 TABLET ORAL at 11:13

## 2023-03-13 RX ADMIN — PROPOFOL 50 MG: 10 INJECTION, EMULSION INTRAVENOUS at 07:45

## 2023-03-13 RX ADMIN — LIDOCAINE HYDROCHLORIDE ANHYDROUS AND DEXTROSE MONOHYDRATE 1.4 MG/KG/HR: .4; 5 INJECTION, SOLUTION INTRAVENOUS at 07:54

## 2023-03-13 ASSESSMENT — PAIN SCALES - GENERAL
PAINLEVEL_OUTOF10: 7
PAINLEVEL_OUTOF10: 0
PAINLEVEL_OUTOF10: 4
PAINLEVEL_OUTOF10: 0

## 2023-03-13 ASSESSMENT — LIFESTYLE VARIABLES: SMOKING_STATUS: 1

## 2023-03-13 ASSESSMENT — PAIN DESCRIPTION - LOCATION
LOCATION: THROAT
LOCATION: THROAT

## 2023-03-13 ASSESSMENT — PAIN DESCRIPTION - DESCRIPTORS
DESCRIPTORS: ACHING;SORE;DISCOMFORT
DESCRIPTORS: ACHING;DISCOMFORT;SORE

## 2023-03-13 ASSESSMENT — PAIN DESCRIPTION - ORIENTATION
ORIENTATION: ANTERIOR
ORIENTATION: ANTERIOR

## 2023-03-13 ASSESSMENT — PAIN - FUNCTIONAL ASSESSMENT: PAIN_FUNCTIONAL_ASSESSMENT: 0-10

## 2023-03-13 NOTE — ANESTHESIA POSTPROCEDURE EVALUATION
Department of Anesthesiology  Postprocedure Note    Patient: Lien Cheng  MRN: [id number]  YOB: 1965  Date of evaluation: 3/13/2023      Procedure Summary     Date: 03/13/23 Room / Location: [facility] OR 04 / [facility]    Anesthesia Start: 9378 Anesthesia Stop: 0957    Procedures:       THYROIDECTOMY (Bilateral: Neck)      EXCISION OF VOCAL CORD POLYP (Mouth) Diagnosis:       Thyroid cancer ([address]      Thyroid nodule      Polyp of vocal cord      (Thyroid cancer ([address] [C73])      (Thyroid nodule [E04.1])      (Polyp of vocal cord [J38.1])    Surgeons:  Rosina Aguilar MD Responsible Provider: Evelyn Nascimento MD    Anesthesia Type: General ASA Status: 3          Anesthesia Type: General    Pb Phase I: Pb Score: 9    Pb Phase II: Pb Score: 9      Anesthesia Post Evaluation    Patient location during evaluation: PACU  Patient participation: complete - patient participated  Level of consciousness: awake and alert  Airway patency: patent  Nausea & Vomiting: no nausea and no vomiting  Complications: no  Cardiovascular status: hemodynamically stable  Respiratory status: acceptable  Hydration status: euvolemic  Multimodal analgesia pain management approach

## 2023-03-13 NOTE — PROGRESS NOTES
Discharge instructions reviewed with patient and patient's family. Understanding stated of instructions.   Electronically signed by Meron Salamanca RN on 3/13/2023 at 11:42 AM

## 2023-03-13 NOTE — PROGRESS NOTES
CLINICAL PHARMACY NOTE: MEDS TO BEDS    Total # of Prescriptions Filled: 3   The following medications were delivered to the patient:  Levothyroxine 125mcg  Norco 5-325mg  Zofran 4mg    Additional Documentation:  Patient's daughter Stanley Sherwood picked up in pharmacy 3-13-23 @10:50am

## 2023-03-13 NOTE — ANESTHESIA PRE PROCEDURE
Department of Anesthesiology  Preprocedure Note       Name:  Alcira Parekh   Age:  61 y.o.  :  1965                                          MRN:  [id number]         Date:  3/13/2023      Surgeon: Shayla Olvera):  La Nena Virk MD    Procedure: Procedure(s):  THYROIDECTOMY, NERVE INTERGRITY MONITOR,  PATHOLOGY  EXCISION OF VOCAL CORD POLYP    Medications prior to admission:   Prior to Admission medications    Medication Sig Start Date End Date Taking?  Authorizing Provider   Acetaminophen (TYLENOL 8 HOUR PO) Take by mouth   Yes Historical Provider, MD   vitamin D (CHOLECALCIFEROL) 125 MCG (5000 UT) CAPS capsule Take 5,000 Units by mouth daily    Historical Provider, MD   Ascorbic Acid (VITAMIN C) 1000 MG tablet Take 1,000 mg by mouth daily    Historical Provider, MD   Calcium-Magnesium-Zinc 523-818-9 MG TABS Take by mouth daily    Historical Provider, MD   Potassium 95 MG TABS Take by mouth daily    Historical Provider, MD   zinc 50 MG TABS tablet Take 50 mg by mouth daily    Historical Provider, MD   Cyanocobalamin (B-12) 5000 MCG CAPS Take by mouth daily    Historical Provider, MD   Multiple Vitamins-Minerals (MULTIVITAMIN WOMEN 50+) TABS Take by mouth daily    Historical Provider, MD   cetirizine (ZYRTEC) 10 MG tablet Take 10 mg by mouth daily    Historical Provider, MD   Misc Natural Products (GLUCOSAMINE CHOND COMPLEX/MSM) TABS Take 1 tablet by mouth daily    Historical Provider, MD   Ferrous Sulfate (IRON PO) Take 65 mg by mouth daily    Historical Provider, MD   Pyridoxine HCl (B-6) 100 MG TABS Take by mouth daily    Historical Provider, MD   vitamin E 180 MG (400 UNIT) CAPS capsule Take 400 Units by mouth daily    Historical Provider, MD   Omega 3 1200 MG CAPS Take by mouth daily    Historical Provider, MD       Current medications:    Current Facility-Administered Medications   Medication Dose Route Frequency Provider Last Rate Last Admin    sodium chloride flush 0.9 % injection 5-40 mL  5-40 mL IntraVENous 2 times per day Latasha Brito DO        sodium chloride flush 0.9 % injection 5-40 mL  5-40 mL IntraVENous PRN Logan Patel, DO        0.9 % sodium chloride infusion   IntraVENous PRN Logan Patel DO 5 mL/hr at 23 0610 NoRateChange at 23 0610    ampicillin-sulbactam (UNASYN) 3,000 mg in sodium chloride 0.9 % 100 mL IVPB ([id number])  3,000 mg IntraVENous See Admin Instructions Latasha Brito DO           Allergies:     Allergies   Allergen Reactions    Seasonal        Problem List:    Patient Active Problem List   Diagnosis Code    Thyroid nodule E04.1    Personal history of tobacco use Z87.891    Thyroid cancer ([facility] [address] C73    Vocal cord polyp J38.1    Hoarseness of voice R49.0    Vocal cord anomaly Q31.8       Past Medical History:        Diagnosis Date    Allergies     Thyroid cancer ([facility] [address]        Past Surgical History:        Procedure Laterality Date    APPENDECTOMY         Social History:    Social History     Tobacco Use    Smoking status: Former     Packs/day: 0.50     Years: 30.00     Pack years: 15.00     Types: Cigarettes     Quit date: 2023     Years since quittin.0    Smokeless tobacco: Never   Substance Use Topics    Alcohol use: Yes     Comment: Occasionally                                Counseling given: Not Answered      Vital Signs (Current):   Vitals:    23 0547   BP: (!) 154/73   Pulse: 70   Resp: 18   Temp: 36.7 °C (98.1 °F)   TempSrc: Temporal   SpO2: 95%   Weight: 185 lb (83.9 kg)   Height: 5' 3\" (1.6 m)                                              BP Readings from Last 3 Encounters:   23 (!) 154/73   23 122/78   23 127/81       NPO Status: Time of last liquid consumption:                         Time of last solid consumption:                         Date of last liquid consumption: 23                        Date of last solid food consumption: 23    BMI:   Wt Readings from Last 3 Encounters:   23 185 lb (83.9 kg)   03/06/23 185 lb (83.9 kg)   02/02/23 182 lb 9.6 oz (82.8 kg)     Body mass index is 32.77 kg/m². CBC:   Lab Results   Component Value Date/Time    WBC 6.7 03/06/2023 10:48 AM    RBC 4.76 03/06/2023 10:48 AM    HGB 14.3 03/06/2023 10:48 AM    HCT 42.9 03/06/2023 10:48 AM    MCV 90.1 03/06/2023 10:48 AM    RDW 12.9 03/06/2023 10:48 AM     03/06/2023 10:48 AM       CMP:   Lab Results   Component Value Date/Time     03/06/2023 10:48 AM    K 4.3 03/06/2023 10:48 AM     03/06/2023 10:48 AM    CO2 26 03/06/2023 10:48 AM    BUN 16 03/06/2023 10:48 AM    CREATININE 0.76 03/06/2023 10:48 AM    GLUCOSE 89 03/06/2023 10:48 AM    PROT 6.5 11/17/2022 08:43 AM    CALCIUM 9.5 03/06/2023 10:48 AM    BILITOT 0.3 11/17/2022 08:43 AM    ALKPHOS 103 11/17/2022 08:43 AM    AST 20 11/17/2022 08:43 AM    ALT 19 11/17/2022 08:43 AM       POC Tests: No results for input(s): POCGLU, POCNA, POCK, POCCL, POCBUN, POCHEMO, POCHCT in the last 72 hours.     Coags: No results found for: PROTIME, INR, APTT    HCG (If Applicable): No results found for: PREGTESTUR, PREGSERUM, HCG, HCGQUANT     ABGs: No results found for: PHART, PO2ART, DXI5BUK, LHW5DWA, BEART, [id number]     Type & Screen (If Applicable):  No results found for: LABABO, LABRH    Drug/Infectious Status (If Applicable):  Lab Results   Component Value Date/Time    HEPCAB NON-REACTIVE 11/17/2022 08:43 AM       COVID-19 Screening (If Applicable): No results found for: COVID19    CT Lung 12/11/22  Bilateral 2 mm pulmonary nodules.  See below recommendations for   cross-sectional imaging follow-up per lung rads criteria.       Asymmetric enlargement of the left lobe of the thyroid gland, raise the   possibility of a in isodense nodule.  Correlation with thyroid ultrasound    is   recommended.       Bilateral top-normal sized axillary lymph nodes measuring up to 8 mm in    short   axis.  Clinical correlation recommended.  Correlation with mammography is   also recommended.       LUNG RADS:   Per ACR Lung-RADS Version 1.1       Category 2, Benign appearance or behavior. Anesthesia Evaluation  Patient summary reviewed and Nursing notes reviewed no history of anesthetic complications:   Airway: Mallampati: III  TM distance: >3 FB   Neck ROM: full  Mouth opening: > = 3 FB   Dental: normal exam     Comment: Denies any loose, chipped, or cracked teeth    Pulmonary: breath sounds clear to auscultation  (+) current smoker ( quit 2/2023)                          ROS comment: Bilateral Pulm Nodules   Cardiovascular:  Exercise tolerance: good (>4 METS),         ECG reviewed  Rhythm: regular  Rate: normal           Beta Blocker:  Not on Beta Blocker         Neuro/Psych:   Negative Neuro/Psych ROS              GI/Hepatic/Renal:   (+) GERD: well controlled,           Endo/Other:    (+) malignancy/cancer ( Thyroid). ROS comment: Vocal cord polyp Abdominal:       Abdomen: soft. Vascular: negative vascular ROS. Other Findings:           Anesthesia Plan      general     ASA 3       Induction: intravenous. BIS  MIPS: Postoperative opioids intended and Prophylactic antiemetics administered. Anesthetic plan and risks discussed with patient, spouse and child/children. Use of blood products discussed with patient, spouse and child/children whom consented to blood products. Plan discussed with CRNA and attending.                     Davida Alanis RN   3/13/2023

## 2023-03-13 NOTE — OP NOTE
Operative Note      Patient: Edwin Hull  YOB: 1965  MRN: [id number]    Date of Procedure: 3/13/2023    Pre-Op Diagnosis: Thyroid cancer ([address] [C73]  Thyroid nodule [E04.1]  Polyp of vocal cord [J38.1]    Post-Op Diagnosis: Same       Procedure(s):  THYROIDECTOMY  EXCISION OF VOCAL CORD POLYP, total thyroidectomy, bronchoscopy    Surgeon(s):  Anjel Wilde MD    Assistant:   Resident: Dania Buckner DO    Anesthesia: General    Estimated Blood Loss (mL): less than 50     Complications: None    Specimens:   ID Type Source Tests Collected by Time Destination   A : left true vocal cord polyp Tissue Tissue SURGICAL PATHOLOGY Anjel Wilde MD 3/13/2023 0804    B : TOTAL THYROID Tissue Tissue SURGICAL PATHOLOGY Anjel Wilde MD 3/13/2023 0848        Implants:  * No implants in log *      Drains: * No LDAs found *    Findings: lerge left thryoid nodule. Left vocal cord nodule with some compensatory right thickening of cord. Detailed Description of Procedure: Thyroid Lobectomy Procedure Note     Procedure Details   Direct Laryngoscopy eith excision of lesion  INDICATION: Edwin Hull who presents with left vocal cord anomaly lesion noted on fiberoptic examination. Therefore, panendoscopy with excision was indicated. The patient was consented. Procedure in Detail:       The patient was seen in the Holding Room. The risks, benefits, complications, treatment   options, and expected outcomes were discussed with the patient. The possibilities of   reaction to medication, pulmonary aspiration, perforation of viscus, bleeding, recurrent   infection, finding a normal thyroid, recurrently laryngeal nerve damage, the need for   additional procedures, failure to diagnose a condition, and creating a complication   requiring transfusion or operation were discussed with the patient. The patient concurred   with the proposed plan, giving informed consent. The site of surgery properly   noted/marked.  The patient was taken to Operating Room, identified as Tiny Yudi and   the procedure verified. A Time Out was held and the above   information confirmed. The patient was brought to the operating room and positioned supine on the operating room table. After induction of anesthesia, the patient's head and neck were prepped and draped in the usual sterile fashion. The larynx was exposed with a ankit laryngoscope and visualized with a holliday vadim telescope. . Findings are as noted: left 3 mm oval lesion on medial border of cord at anterior middle thirrd with a small mottling/thickening of right cord which appeared to be compensatory to the left lesion. laryngeal lesions; no epiglotic lesions; no vallecula/base of tongue. Rigid bronchoscopy performed with a 0 degree holliday vadim used to visualize the subglottic area and then down the trachea into the right and left mainstem which all were clear of dissease. Under vision with a telescope, the lesion was grasped and scissors used to fully excise the lesion with a cuff of normal tissue around it with out entering the vocal ligament proper. . Hemostasis was achieved with epinephrine-impregnated cottonoid pledgets. There were no palpable cervical lymphadenopathies (other than known thyroid mass) or floor of mouth lesions or base of tongue lesion other than the one noted. The 6.0 et tube was changes to a 6.0 nim tube under direct visualizaiton. Attnetion then turned to the thryoid surgery. The patient was placed supine after induction of a general anesthetic with a nerve   monitoring tube, the leads placed, system calibrated, and checked for a graphical   response with laryngeal stimulation. The neck was extended and a 8 cm transverse cervical incision was drawn above the sternal notch within a   natural skin fold. 10 cc of 1% lidocaine with 1:100,000 epinephrine was injected and   patient prepped and draped in standard fashion.    Incision with a 15 blade was taken thru skin and platysma and subplatysmal flaps raised   inferiorly ans superiorly. The strap muscles were identified and divided at the midline   from the hyoid to the sternal notch. Sharp dissection was used to mobilize the   left thyroid lobe in a medial direction. The thyroid lobe was mobilized   further and the superior and inferior pole vessels were divided with the bipolar. The middle thyroid vein was divided with the bipolar. Small vessels were   likewise divided. Parathyroid gland tissue was identified and dissected off the thyroid   and preserved laterally in the neck. The gland was rotated in a medial direction and   taken off the trachea using the bipolar, there apppeated to be a stalk and nubbin of thyroid tissue emanating off the inferio portion of the lobe over the lateral trachea- it was kept in continuity with the thyoid The isthmus was removed off the   thyroid cartilage using the bipolar. The procedure was repeated on the right side with similar findings including the nubbin stalk of thyroid tissue in the same spot of the inferior thyroid anterolateral to the trachea. . Central and lateral neck   and anterior mediastinum were palpated and found no irregularities, nodules, or masses. Specimen oriented and sent for patholiogical analysis. The wound was irrigated and   inspected carefully. The parathyroid tissue was found to be viable and the recurrent   laryngeal nerve was left intact in its anatomic locations with graphical representation on   the nerve monitoring system with low level nerve stimulation. The strap muscles were   closed with interrupted 4-0 Vicryl suture. The platysma was closed with interrupted 4-0   monocryl suture, and the skin incision was closed with a 5-0 Monocryl subcuticular   closure. A Steri-Strips and mastisol was applied to the incision. Instrument, sponge, and needle counts were correct prior to closure and at the   conclusion of the case.  Pt care transferred to anesthesia, woken up, extubated and to pacu in satisfactory condition  Findings: The bilateral recurrent laryngeal nerve was identified. Parathyroid tissue   was identified and preserved with a viable blood supply.   Electronically signed by Tristen Gonzalez MD on 3/13/2023 at 9:51 AM

## 2023-03-13 NOTE — DISCHARGE INSTRUCTIONS
ENT Post-Op Instructions    Follow up with Dr. Aura Cifuentes  in 1 week(s). CALL OFFICE TO SCHEDULE/CONFIRM APPOINTMENT    Please follow the instructions below:    DIET INSTRUCTIONS:  Regular diet. Start with light meals today and increase as tolerated. ACTIVITY INSTRUCTIONS:  Increase activity as tolerated    Elevate Head of bed   No heavy lifting or strenuous activity until your cleared at your post-operative appointment   No driving while taking pain medication    WOUND/DRESSING INSTRUCTIONS:  May shower normally in 24 hours from time of surgery. May shower from the neck down tonight. Ice to areas of pain. You have sutures that dissolve and do not need to be removed. You have steri-trip bandages (white bandages) over your incision. Leave these in place. Do not remove them. They will fall off in about 1 week. They will curl and peel at the edges, this is normal. They are OK to get wet, but do not soak. Blot dry, do not scrub. Once the steri strips have fallen off or been removed in the office, place antibiotic ointment on the incision twice a day for 1-2 weeks. OK to use scar cream after 2 weeks. Use sunscreen when going out in the sun for the first 6 months. Be careful to not extend your head backwards for a few weeks, this puts tension on the incision line and can cause more scarring. MEDICATION INSTRUCTIONS:  Take medication as prescribed. When taking pain medications, you may experience dizziness or drowsiness. Do not drink alcohol or drive when taking these medications. You may take Ibuprofen (over the counter) as per directions for mild pain. Do not take any other acetaminophen (Tylenol) products while taking your pain medication. You may experience constipation while taking narcotic pain medication - You may take over the counter stool softeners: docuscate (Colace) or sennosides S (Senokot - S).    Take Calcium (TUMS) 1,500mg if you experience numbness or tingling of the fingers or lips. Call the office if this does not resolve.     Call physician for any of the following or for questions/concerns:   Fever over 101° F    Redness, swelling, hardness or warmth at the wound site (s)  Unrelieved nausea/vomiting    Foul smelling or cloudy drainage at the wound site (s)   Unrelieved pain or increase in pain     Increase in shortness of breath

## 2023-03-13 NOTE — H&P
H&P reviewed, no changes. No issues. Questions and concerns were answered to the patient's satisfaction.  Will proceed with procedure    Will discuss with attending     Electronically signed by Kit Quintana DO on 3/13/23 at 7:11 AM EDT

## 2023-03-14 NOTE — PROGRESS NOTES
Patient seen for 30 minute in person session this date. Patient doing fair. Surgery is scheduled for 03/13/23. Today, we reviewed all vocal hygiene protocol and patient expressed understanding and mostly compliant to all. We worked on vocal function exercises which patient completed with fair/fair+ protocol. She reports some practice since last session but has been busy and unable to complete daily. She reports that she did not work on easy onset words this past week. We worked on them today and she completed with fair performance with mod cues. Since patient is having surgery on Monday, we agreed that she would return as soon as her doctor released her to resume therapy. She will call when that occurs. Karmen Lucas.  Nadine Arteaga MA/CCC-SLP  [id number]  CPT [id number]

## 2023-03-14 NOTE — PROGRESS NOTES
Patient seen for 30 minute in person session this date. Patient reports she is doing ok. She states that her allergies are flaring up and she has more sinus congestion and drainage today. She reports that she has not been practicing the vocal function exercises consistently as instructed and has not practice the easy onset words at all due to not feeling well. She was again educated about need to practice and implement good hygiene strategies in order for progress to occur. She was also educated about the fact that although she is having the vocal nodule removed in surgery on 03/13/23, it can return if she does not correct the vocally abusive behaviors that caused the nodule in the first place. She states she understands and will try to do more this next week ahead. She completed all vocal function exercises and easy onset strategies today with fair performance and mod cues. Homework strongly encouraged. Will continue. Thelma Childress.  Geetha Mendez MA/CCC-SLP  [id number]  [facility] [id number]

## 2023-03-28 DIAGNOSIS — C73 THYROID CANCER (HCC): ICD-10-CM

## 2023-03-30 LAB
THYROGLOB AB SERPL-ACNC: <0.9 IU/ML (ref 0–4)
THYROGLOB SERPL-MCNC: 7.9 NG/ML (ref 1.3–31.8)
THYROGLOB SERPL-MCNC: NORMAL NG/ML (ref 1.3–31.8)

## 2023-04-03 LAB — THYROGLOB IGG SER-ACNC: <12 IU/ML (ref 0–40)

## 2023-04-04 ENCOUNTER — CASE MANAGEMENT (OUTPATIENT)
Dept: OTOLARYNGOLOGY | Age: 58
End: 2023-04-04

## 2023-04-04 NOTE — PROGRESS NOTES
Patient discussed at [facility] and Neck multidisciplinary tumor board conference on: 4/4/23  Presenting Physician: Valdo Uriostegui DO    Summary    61y.o. year old female with Stage 1, pT2 (cN0 neck) papillary thyroid carcinoma s/p totally thyroidectomy on 3/13/23    Primary Site: Thyroid  Histology: PTC  National Guidelines Discussed: Per NCCN    Recommendations  Total thyroidectomy was definitive surgical management. Tg and anti-TG values were low as expected s/p total thyroidectomy and not indicative of any residual disease. Will continue clinical surveillance.      Electronically signed by Alexis Carty DO on 4/4/2023 at 8:31 AM

## 2023-04-14 ENCOUNTER — TREATMENT (OUTPATIENT)
Dept: SPEECH THERAPY | Age: 58
End: 2023-04-14

## 2023-04-14 DIAGNOSIS — R49.0 HOARSENESS: Primary | ICD-10-CM

## 2023-04-18 ENCOUNTER — OFFICE VISIT (OUTPATIENT)
Dept: ENDOCRINOLOGY | Age: 58
End: 2023-04-18
Payer: MEDICAID

## 2023-04-18 VITALS
WEIGHT: 190 LBS | HEIGHT: 63 IN | HEART RATE: 72 BPM | OXYGEN SATURATION: 100 % | BODY MASS INDEX: 33.66 KG/M2 | SYSTOLIC BLOOD PRESSURE: 115 MMHG | DIASTOLIC BLOOD PRESSURE: 81 MMHG

## 2023-04-18 DIAGNOSIS — E89.0 POSTOPERATIVE HYPOTHYROIDISM: ICD-10-CM

## 2023-04-18 DIAGNOSIS — C73 THYROID CANCER (HCC): Primary | ICD-10-CM

## 2023-04-18 PROCEDURE — 99204 OFFICE O/P NEW MOD 45 MIN: CPT | Performed by: INTERNAL MEDICINE

## 2023-04-18 PROCEDURE — 1036F TOBACCO NON-USER: CPT | Performed by: INTERNAL MEDICINE

## 2023-04-18 PROCEDURE — 3017F COLORECTAL CA SCREEN DOC REV: CPT | Performed by: INTERNAL MEDICINE

## 2023-04-18 PROCEDURE — G8417 CALC BMI ABV UP PARAM F/U: HCPCS | Performed by: INTERNAL MEDICINE

## 2023-04-18 PROCEDURE — G8427 DOCREV CUR MEDS BY ELIG CLIN: HCPCS | Performed by: INTERNAL MEDICINE

## 2023-04-18 NOTE — PROGRESS NOTES
[address] [place] [address] Department of Endocrinology Diabetes and Metabolism   [address] [facility] [address]   Phone: [phone]  Fax: [phone]    Date of Service: 4/18/2023  Primary Care Physician: Liza Stout DO  Referring physician: Lynn Nunez   Provider: Mary Tena MD     Reason for the visit:  Papillary thyroid cancer     History of Present Illness: The history is provided by the patient. No  was used. Accuracy of the patient data is excellent. Yuan Truong is a very pleasant 61 y.o. female seen today for management of thyroid cancer     The patient underwent total thyroidectomy on 3/13/2023. Papillary thyroid cancer   Pathology report:  Tumor focality: Unifocal   Tumor site: Left lobe   Tumor size: Greatest dimension - 3.5 cm   Histologic type: Papillary carcinoma, classic (usual, conventional)   Angioinvasion: Not identified   Lymphatic invasion: Not identified   Perithyroidal extension: Not identified   Margin status: All margins negative for carcinoma. Distance from   invasive carcinoma to closest margin: <0.1 cm (inked anterior surface). Regional lymph node status: Not applicable (no regional lymph nodes   submitted or found)   Distant metastasis: Not applicable   Pathologic Stage Classification (pTNM, AJCC eighth edition):    pT Category:  pT2    pN Category:  pN not assigned (no nodes submitted or found)   Additional findings: No parathyroid tissue seen. The patient currently on levothyroxine 125 mcg daily     Yuan Truong denies any new lumps, bumps in the neck, voice change or shortness of breath. No family history of thyroid cancer. No prior history of radiation to head or neck region.     PAST MEDICAL HISTORY   Past Medical History:   Diagnosis Date    Allergies     Thyroid cancer ([address]        PAST SURGICAL HISTORY   Past Surgical History:   Procedure Laterality Date    APPENDECTOMY  1978    LARYNGOSCOPY N/A 3/13/2023    EXCISION OF VOCAL

## 2023-04-21 ENCOUNTER — OFFICE VISIT (OUTPATIENT)
Dept: FAMILY MEDICINE CLINIC | Age: 58
End: 2023-04-21
Payer: MEDICAID

## 2023-04-21 ENCOUNTER — TREATMENT (OUTPATIENT)
Dept: SPEECH THERAPY | Age: 58
End: 2023-04-21

## 2023-04-21 VITALS
TEMPERATURE: 97.2 F | HEIGHT: 63 IN | DIASTOLIC BLOOD PRESSURE: 86 MMHG | OXYGEN SATURATION: 98 % | BODY MASS INDEX: 33.66 KG/M2 | RESPIRATION RATE: 17 BRPM | WEIGHT: 190 LBS | SYSTOLIC BLOOD PRESSURE: 145 MMHG | HEART RATE: 64 BPM

## 2023-04-21 DIAGNOSIS — T81.49XA WOUND CELLULITIS AFTER SURGERY: Primary | ICD-10-CM

## 2023-04-21 DIAGNOSIS — R49.0 HOARSENESS: Primary | ICD-10-CM

## 2023-04-21 PROCEDURE — 1036F TOBACCO NON-USER: CPT

## 2023-04-21 PROCEDURE — G8417 CALC BMI ABV UP PARAM F/U: HCPCS

## 2023-04-21 PROCEDURE — 3017F COLORECTAL CA SCREEN DOC REV: CPT

## 2023-04-21 PROCEDURE — G8427 DOCREV CUR MEDS BY ELIG CLIN: HCPCS

## 2023-04-21 PROCEDURE — 99213 OFFICE O/P EST LOW 20 MIN: CPT

## 2023-04-21 RX ORDER — CEPHALEXIN 500 MG/1
500 CAPSULE ORAL 4 TIMES DAILY
Qty: 28 CAPSULE | Refills: 0 | Status: SHIPPED | OUTPATIENT
Start: 2023-04-21 | End: 2023-04-28

## 2023-04-21 SDOH — ECONOMIC STABILITY: HOUSING INSECURITY
IN THE LAST 12 MONTHS, WAS THERE A TIME WHEN YOU DID NOT HAVE A STEADY PLACE TO SLEEP OR SLEPT IN A SHELTER (INCLUDING NOW)?: NO

## 2023-04-21 SDOH — ECONOMIC STABILITY: FOOD INSECURITY: WITHIN THE PAST 12 MONTHS, YOU WORRIED THAT YOUR FOOD WOULD RUN OUT BEFORE YOU GOT MONEY TO BUY MORE.: NEVER TRUE

## 2023-04-21 SDOH — ECONOMIC STABILITY: FOOD INSECURITY: WITHIN THE PAST 12 MONTHS, THE FOOD YOU BOUGHT JUST DIDN'T LAST AND YOU DIDN'T HAVE MONEY TO GET MORE.: NEVER TRUE

## 2023-04-21 SDOH — ECONOMIC STABILITY: INCOME INSECURITY: HOW HARD IS IT FOR YOU TO PAY FOR THE VERY BASICS LIKE FOOD, HOUSING, MEDICAL CARE, AND HEATING?: NOT HARD AT ALL

## 2023-04-21 NOTE — PROGRESS NOTES
23  Jonathon Ramires : 1965 Sex: female  Age 61 y.o. Subjective:  Chief Complaint   Patient presents with    Neck Pain     Had thyroid removed on  and now has redness and a lump on neck and painful       HPI:   Jonathon Ramires , 61 y.o. female presents to the clinic for evaluation of had thyroidectomy  x 3 days. The patient also reports has redness . The patient has taken nothing for symptoms. The patient reports worsening symptoms over time. The patient reports having a thyroidectomy in march and was healing fine and three days ago she experiencing pain and site is warm to touch. The patient denies acute loss of taste and smell, headache, sinus congestion, cough, sore throat, rash, and fever. The patient also denies chest pain, abdominal pain, shortness of breath, wheezing, and nausea / vomiting / diarrhea. ROS:   Unless otherwise stated in this report the patient's positive and negative responses for review of systems for constitutional, eyes, ENT, cardiovascular, respiratory, gastrointestinal, neurological, , musculoskeletal, and integument systems and related systems to the presenting problem are either stated in the history of present illness or were not pertinent or were negative for the symptoms and/or complaints related to the presenting medical problem. Positives and pertinent negatives as per HPI. All others reviewed and are negative.       PMH:     Past Medical History:   Diagnosis Date    Allergies     Thyroid cancer [facility])        Past Surgical History:   Procedure Laterality Date    APPENDECTOMY      LARYNGOSCOPY N/A 3/13/2023    EXCISION OF VOCAL CORD POLYP performed by Remington Mata MD at [facility] [address] Bilateral 3/13/2023    THYROIDECTOMY performed by Remington Mata MD at [address] [place] [address] History   Problem Relation Age of Onset    Atrial Fibrillation Mother        Medications:     Current Outpatient Medications:     cephALEXin (KEFLEX) 500 MG capsule, Take 1

## 2023-04-22 PROBLEM — E89.0 POSTOPERATIVE HYPOTHYROIDISM: Status: ACTIVE | Noted: 2023-04-22

## 2023-04-27 ENCOUNTER — TREATMENT (OUTPATIENT)
Dept: SPEECH THERAPY | Age: 58
End: 2023-04-27

## 2023-04-27 DIAGNOSIS — R49.0 HOARSENESS: Primary | ICD-10-CM

## 2023-05-05 NOTE — PROGRESS NOTES
Patient seen for 30 minute in person session this date. Patient reports she is still upset at vocal quality and feels that it is worse since the surgery. Explained about needing time to heal and to continue with all exercises. She reports that she has some pain on swallowing and it feels different when she swallows. Again, we discussed need for more time to heal after such a surgery. She demonstrated fair+ performance with all exercises and reports compliance to a safe swallow protocol discussed. Encouraged to continue with all exercises. Will continue. Smiley Marcial.  Meron Patiño MA/JOSEPH-SLP  [id number]  CPT [id number]
minimum assist (75% patients effort)

## 2023-05-05 NOTE — PROGRESS NOTES
Patient seen for 30 minute in person session this date. Patient reports that she is doing a little better this date. Her vocal quality is smoother with decreased hoarseness although it is still present. She is no longer whispering and is voicing consistently. We reviewed all exercises and she completed with good performance with min cues. Reports decreased pain with swallowing. Encouraged her to continue with practice daily and remain with safe swallow protocol at this time. Will continue. Jesse Han.  Rosamond Riedel, MA/[facility]-SLP  [id number]  [facility] [address]

## 2023-05-05 NOTE — PROGRESS NOTES
Patient seen for 30 minute in person session this date. Patient reports that she is doing much better. Her vocal quality today was much stronger and clearer than in past sessions. She reports no pain and is eating and drinking well. We reviewed all exercises and she continues to complete vocal function exercises with good performance. We reviewed need for good vocal hygiene and she reports compliance to all. She is going to have some testing done in mid-May and expressed anxiousness. Support provided. Will continue. Smith Polanco.  Jannette Beebe MA/JOSEPH-SLP  [id number]  [facility] [address]

## 2023-05-15 ENCOUNTER — HOSPITAL ENCOUNTER (OUTPATIENT)
Dept: NUCLEAR MEDICINE | Age: 58
Discharge: HOME | End: 2023-05-15
Payer: MEDICAID

## 2023-05-15 ENCOUNTER — HOSPITAL ENCOUNTER (OUTPATIENT)
Age: 58
Discharge: HOME | End: 2023-05-15
Payer: MEDICAID

## 2023-05-15 DIAGNOSIS — C73 THYROID CANCER (HCC): ICD-10-CM

## 2023-05-15 DIAGNOSIS — Z01.818 PRE-OP EVALUATION: ICD-10-CM

## 2023-05-15 LAB
ANION GAP SERPL CALCULATED.3IONS-SCNC: 11 MMOL/L (ref 7–16)
BASOPHILS # BLD: 0.03 E9/L (ref 0–0.2)
BASOPHILS NFR BLD: 0.5 % (ref 0–2)
BUN SERPL-MCNC: 7 MG/DL (ref 6–20)
CALCIUM SERPL-MCNC: 9.5 MG/DL (ref 8.6–10.2)
CHLORIDE SERPL-SCNC: 103 MMOL/L (ref 98–107)
CO2 SERPL-SCNC: 26 MMOL/L (ref 22–29)
CREAT SERPL-MCNC: 0.9 MG/DL (ref 0.5–1)
EOSINOPHIL # BLD: 0.14 E9/L (ref 0.05–0.5)
EOSINOPHIL NFR BLD: 2.4 % (ref 0–6)
ERYTHROCYTE [DISTWIDTH] IN BLOOD BY AUTOMATED COUNT: 13.6 FL (ref 11.5–15)
GLUCOSE SERPL-MCNC: 94 MG/DL (ref 74–99)
HCT VFR BLD AUTO: 43.7 % (ref 34–48)
HGB BLD-MCNC: 14.4 G/DL (ref 11.5–15.5)
IMM GRANULOCYTES # BLD: 0.01 E9/L
IMM GRANULOCYTES NFR BLD: 0.2 % (ref 0–5)
LYMPHOCYTES # BLD: 2.5 E9/L (ref 1.5–4)
LYMPHOCYTES NFR BLD: 42.7 % (ref 20–42)
MCH RBC QN AUTO: 29.6 PG (ref 26–35)
MCHC RBC AUTO-ENTMCNC: 33 % (ref 32–34.5)
MCV RBC AUTO: 89.9 FL (ref 80–99.9)
MONOCYTES # BLD: 0.34 E9/L (ref 0.1–0.95)
MONOCYTES NFR BLD: 5.8 % (ref 2–12)
NEUTROPHILS # BLD: 2.84 E9/L (ref 1.8–7.3)
NEUTS SEG NFR BLD: 48.4 % (ref 43–80)
PLATELET # BLD AUTO: 236 E9/L (ref 130–450)
PMV BLD AUTO: 10.2 FL (ref 7–12)
POTASSIUM SERPL-SCNC: 4 MMOL/L (ref 3.5–5)
RBC # BLD AUTO: 4.86 E12/L (ref 3.5–5.5)
SODIUM SERPL-SCNC: 140 MMOL/L (ref 132–146)
T4 FREE SERPL-MCNC: 0.29 NG/DL (ref 0.93–1.7)
TSH SERPL-MCNC: 51.15 UIU/ML (ref 0.27–4.2)
WBC # BLD: 5.9 E9/L (ref 4.5–11.5)

## 2023-05-15 PROCEDURE — 86800 THYROGLOBULIN ANTIBODY: CPT

## 2023-05-15 PROCEDURE — 83018 HEAVY METAL QUAN EACH NES: CPT

## 2023-05-15 PROCEDURE — A9517 I131 IODIDE CAP, RX: HCPCS | Performed by: RADIOLOGY

## 2023-05-15 PROCEDURE — 3430000000 HC RX DIAGNOSTIC RADIOPHARMACEUTICAL: Performed by: RADIOLOGY

## 2023-05-15 PROCEDURE — 36415 COLL VENOUS BLD VENIPUNCTURE: CPT

## 2023-05-15 PROCEDURE — 84443 ASSAY THYROID STIM HORMONE: CPT

## 2023-05-15 PROCEDURE — 84432 ASSAY OF THYROGLOBULIN: CPT

## 2023-05-15 PROCEDURE — 84439 ASSAY OF FREE THYROXINE: CPT

## 2023-05-15 PROCEDURE — 80048 BASIC METABOLIC PNL TOTAL CA: CPT

## 2023-05-15 PROCEDURE — 85025 COMPLETE CBC W/AUTO DIFF WBC: CPT

## 2023-05-15 RX ADMIN — SODIUM IODIDE I 131 3 MILLICURIE: 1 CAPSULE, GELATIN COATED ORAL at 13:20

## 2023-05-16 LAB
Lab: NORMAL
THIS TEST SENT TO: NORMAL

## 2023-05-17 ENCOUNTER — HOSPITAL ENCOUNTER (OUTPATIENT)
Dept: NUCLEAR MEDICINE | Age: 58
Discharge: HOME | End: 2023-05-17
Payer: MEDICAID

## 2023-05-17 LAB
THYROGLOB AB SERPL-ACNC: <0.9 IU/ML (ref 0–4)
THYROGLOB SERPL-MCNC: 5.5 NG/ML (ref 1.3–31.8)
THYROGLOB SERPL-MCNC: NORMAL NG/ML (ref 1.3–31.8)

## 2023-05-19 ENCOUNTER — TELEPHONE (OUTPATIENT)
Dept: ENDOCRINOLOGY | Age: 58
End: 2023-05-19

## 2023-05-19 ENCOUNTER — HOSPITAL ENCOUNTER (OUTPATIENT)
Dept: NUCLEAR MEDICINE | Age: 58
Discharge: HOME | End: 2023-05-19
Payer: MEDICAID

## 2023-05-19 PROCEDURE — 3430000000 HC RX DIAGNOSTIC RADIOPHARMACEUTICAL: Performed by: RADIOLOGY

## 2023-05-19 PROCEDURE — 79005 NUCLEAR RX ORAL ADMIN: CPT

## 2023-05-19 PROCEDURE — A9517 I131 IODIDE CAP, RX: HCPCS | Performed by: RADIOLOGY

## 2023-05-19 RX ADMIN — SODIUM IODIDE I 131 30 MILLICURIE: 1 CAPSULE, GELATIN COATED ORAL at 12:55

## 2023-05-19 RX ADMIN — SODIUM IODIDE I 131 30 MILLICURIE: 1 CAPSULE, GELATIN COATED ORAL at 13:00

## 2023-05-19 NOTE — TELEPHONE ENCOUNTER
Endocrine staff,   Please notify pt that I have reviewed your recent results.      Excellent posttherapy scan was negative for any evidence of distal mets

## 2023-05-22 LAB
Lab: NORMAL
REPORT: NORMAL
THIS TEST SENT TO: NORMAL

## 2023-05-26 ENCOUNTER — HOSPITAL ENCOUNTER (OUTPATIENT)
Dept: NUCLEAR MEDICINE | Age: 58
Discharge: HOME | End: 2023-05-26
Payer: MEDICAID

## 2023-05-26 DIAGNOSIS — C73 THYROID CANCER (HCC): ICD-10-CM

## 2023-05-26 PROCEDURE — 78018 THYROID MET IMAGING BODY: CPT

## 2023-06-27 ENCOUNTER — OFFICE VISIT (OUTPATIENT)
Dept: ENT CLINIC | Age: 58
End: 2023-06-27

## 2023-06-27 VITALS
HEIGHT: 63 IN | DIASTOLIC BLOOD PRESSURE: 88 MMHG | WEIGHT: 186 LBS | SYSTOLIC BLOOD PRESSURE: 137 MMHG | HEART RATE: 77 BPM | BODY MASS INDEX: 32.96 KG/M2

## 2023-06-27 DIAGNOSIS — J38.1 VOCAL CORD POLYP: ICD-10-CM

## 2023-06-27 DIAGNOSIS — C73 THYROID CANCER (HCC): Primary | ICD-10-CM

## 2023-06-27 DIAGNOSIS — R49.0 HOARSENESS OF VOICE: ICD-10-CM

## 2023-06-27 DIAGNOSIS — R13.12 OROPHARYNGEAL DYSPHAGIA: ICD-10-CM

## 2023-06-27 DIAGNOSIS — R43.2 DISTORTED TASTE: ICD-10-CM

## 2023-07-12 ENCOUNTER — OFFICE VISIT (OUTPATIENT)
Dept: ENDOCRINOLOGY | Age: 58
End: 2023-07-12
Payer: MEDICAID

## 2023-07-12 VITALS
HEIGHT: 63 IN | DIASTOLIC BLOOD PRESSURE: 70 MMHG | HEART RATE: 81 BPM | BODY MASS INDEX: 33.49 KG/M2 | SYSTOLIC BLOOD PRESSURE: 109 MMHG | WEIGHT: 189 LBS | OXYGEN SATURATION: 99 %

## 2023-07-12 DIAGNOSIS — E89.0 POSTOPERATIVE HYPOTHYROIDISM: ICD-10-CM

## 2023-07-12 DIAGNOSIS — C73 THYROID CANCER (HCC): Primary | ICD-10-CM

## 2023-07-12 LAB
T4 FREE SERPL-MCNC: 2.16 NG/DL (ref 0.93–1.7)
TSH SERPL-MCNC: 0.12 UIU/ML (ref 0.27–4.2)

## 2023-07-12 PROCEDURE — G8417 CALC BMI ABV UP PARAM F/U: HCPCS | Performed by: INTERNAL MEDICINE

## 2023-07-12 PROCEDURE — 1036F TOBACCO NON-USER: CPT | Performed by: INTERNAL MEDICINE

## 2023-07-12 PROCEDURE — G8427 DOCREV CUR MEDS BY ELIG CLIN: HCPCS | Performed by: INTERNAL MEDICINE

## 2023-07-12 PROCEDURE — 3017F COLORECTAL CA SCREEN DOC REV: CPT | Performed by: INTERNAL MEDICINE

## 2023-07-12 PROCEDURE — 99214 OFFICE O/P EST MOD 30 MIN: CPT | Performed by: INTERNAL MEDICINE

## 2023-07-12 RX ORDER — LEVOTHYROXINE SODIUM 0.12 MG/1
125 TABLET ORAL DAILY
Qty: 90 TABLET | Refills: 3 | Status: SHIPPED | OUTPATIENT
Start: 2023-07-12

## 2023-07-12 NOTE — PROGRESS NOTES
[address] [facility] Department of Endocrinology Diabetes and Metabolism   [address] [facility] [address]   Phone: [phone]  Fax: [phone]    Date of Service: 7/12/2023  Primary Care Physician: Indira Page DO  Referring physician: No ref. provider found   Provider: Shameka Rudd MD     Reason for the visit:  Papillary thyroid cancer postsurgical hypothyroidism    History of Present Illness: The history is provided by the patient. No  was used. Accuracy of the patient data is excellent. Vivi Vázquez is a very pleasant 60 y.o. female seen today for management of thyroid cancer     The patient underwent total thyroidectomy on 3/13/2023. Papillary thyroid cancer   Pathology report:  Tumor focality: Unifocal   Tumor site: Left lobe   Tumor size: Greatest dimension - 3.5 cm   Histologic type: Papillary carcinoma, classic (usual, conventional)   Angioinvasion: Not identified   Lymphatic invasion: Not identified   Perithyroidal extension: Not identified   Margin status: All margins negative for carcinoma. Distance from   invasive carcinoma to closest margin: <0.1 cm (inked anterior surface). Regional lymph node status: Not applicable (no regional lymph nodes   submitted or found)   Distant metastasis: Not applicable   Pathologic Stage Classification (pTNM, AJCC eighth edition):    pT Category:  pT2    pN Category:  pN not assigned (no nodes submitted or found)   Additional findings: No parathyroid tissue seen. The patient received 29.8 mCi of I-131 radioactive iodine ablation on May 19, 2023. Therapy scan was negative for any distant metastasis. The patient currently on levothyroxine 125 mcg daily     Vivi Vázquez denies any new lumps, bumps in the neck, voice change or shortness of breath. No family history of thyroid cancer. No prior history of radiation to head or neck region.     PAST MEDICAL HISTORY   Past Medical History:   Diagnosis Date    Allergies

## 2023-07-14 ENCOUNTER — TELEPHONE (OUTPATIENT)
Dept: ENDOCRINOLOGY | Age: 58
End: 2023-07-14

## 2023-07-14 LAB
THYROGLOB AB SERPL-ACNC: <0.9 IU/ML (ref 0–4)
THYROGLOB SERPL-MCNC: 0.2 NG/ML (ref 1.3–31.8)
THYROGLOB SERPL-MCNC: ABNORMAL NG/ML (ref 1.3–31.8)

## 2023-07-14 NOTE — TELEPHONE ENCOUNTER
Notify patient  Thyroid hormones are at goal for your thyroid cancer. I do recommend continue current dose of levothyroxine unless she is complaining of palpitations.    If she is experiencing palpitations then will slightly decrease the dose

## 2023-07-21 ENCOUNTER — TELEPHONE (OUTPATIENT)
Dept: ENDOCRINOLOGY | Age: 58
End: 2023-07-21

## 2023-07-21 NOTE — TELEPHONE ENCOUNTER
Notify patient  Great thyroid tumor marker is low. We will discuss this in details at your next office visit.

## 2023-10-21 ENCOUNTER — HOSPITAL ENCOUNTER (OUTPATIENT)
Dept: ULTRASOUND IMAGING | Age: 58
Discharge: HOME | End: 2023-10-21
Payer: MEDICAID

## 2023-10-21 DIAGNOSIS — C73 THYROID CANCER (HCC): ICD-10-CM

## 2023-10-21 PROCEDURE — 76536 US EXAM OF HEAD AND NECK: CPT

## 2023-10-23 NOTE — PROGRESS NOTES
10/24/23: Pt here for 4 mo follow up thyroid CA. US thyroid completed 10/21/23, not resulted yet. No pain, but mentions a periodic Rt sore throat. No difficulty swallowing. States voice is more raspy than normal, but does mention seasonal allergies. Drinks water throughout the day, 2 cups of coffee, and currently no alcohol. Weight is stable.

## 2023-10-24 ENCOUNTER — OFFICE VISIT (OUTPATIENT)
Dept: ENT CLINIC | Age: 58
End: 2023-10-24
Payer: MEDICAID

## 2023-10-24 VITALS
BODY MASS INDEX: 35.26 KG/M2 | HEART RATE: 88 BPM | HEIGHT: 63 IN | SYSTOLIC BLOOD PRESSURE: 137 MMHG | WEIGHT: 199 LBS | DIASTOLIC BLOOD PRESSURE: 91 MMHG

## 2023-10-24 DIAGNOSIS — C73 THYROID CANCER (HCC): Primary | ICD-10-CM

## 2023-10-24 DIAGNOSIS — J38.1 VOCAL CORD POLYP: ICD-10-CM

## 2023-10-24 DIAGNOSIS — R49.0 DYSPHONIA: ICD-10-CM

## 2023-10-24 PROCEDURE — G8427 DOCREV CUR MEDS BY ELIG CLIN: HCPCS | Performed by: OTOLARYNGOLOGY

## 2023-10-24 PROCEDURE — 1036F TOBACCO NON-USER: CPT | Performed by: OTOLARYNGOLOGY

## 2023-10-24 PROCEDURE — G8484 FLU IMMUNIZE NO ADMIN: HCPCS | Performed by: OTOLARYNGOLOGY

## 2023-10-24 PROCEDURE — G8417 CALC BMI ABV UP PARAM F/U: HCPCS | Performed by: OTOLARYNGOLOGY

## 2023-10-24 PROCEDURE — 31575 DIAGNOSTIC LARYNGOSCOPY: CPT | Performed by: OTOLARYNGOLOGY

## 2023-10-24 PROCEDURE — 99214 OFFICE O/P EST MOD 30 MIN: CPT | Performed by: OTOLARYNGOLOGY

## 2023-10-24 PROCEDURE — 3017F COLORECTAL CA SCREEN DOC REV: CPT | Performed by: OTOLARYNGOLOGY

## 2023-10-24 NOTE — PROGRESS NOTES
Taste dist      Dear Dr Errol Champion, DO No ref. provider found     We had the pleasure of seeing Tammi Julio, 1965 here    on 10/24/2023  Please see below for review of care and plans. Chief complaint-   No diagnosis found. 3/13/23 - THYROIDECTOMY EXCISION OF VOCAL CORD POLYP    History of Present Illness- 2/02/23: New Pt here for thyroid cancer. First noted on a CT lung on 12/11/22. Thyroid US and FNA in chart 12/15/22 and 1/18/23. No pain, just has soreness in neck since biopsy and tenderness in Rt shoulder. Mentions coughing up a lot of mucous and has frequent Lt sided sinus pressure and wooshing in head/ears. No difficulty swallowing. Froggy voice and seasonally loses voice. Drinks water throughout the day, 2 cups of coffee, and occasionally alcohol. Weight has been stable recently, but lost 33 lbs since July with diet change. - hx of radiation exposure. + family hx of thyroid disease- cousin with benign, no cancers in family    3/28/23: Pt here for post op total thyroidectomy and vocal cord polyp removal completed 3/13/23. No pain, just tightness around incision. No difficulty swallowing. Loss of voice, very soft and raspy. Drinks water throughout the day, 2 cups of coffee, and currently no alcohol. Weight is stable. 6/27/23: Pt here for 3 mo follow up vocal cord lesion removal. Has been prescribed Pepcid for acid reflux but has not started taking it yet. Mentions soreness in neck and throat, has an \"electrical shock\" sensation in neck. Periodic difficulty swallowing, having to swallow multiple times to get food to move and everything tastes like dirt since taking the radiation pill from Dr. Bayron Rosado. Deeper groggy voice and periodic dry cough. Drinks water throughout the day, 2 cups of coffee, and currently no alcohol. Weight is currently stable. 10/24/23: Pt here for 4 mo follow up thyroid CA. US thyroid completed 10/21/23, not resulted yet. No pain, but mentions a periodic Rt sore throat.

## 2023-11-01 ENCOUNTER — OFFICE VISIT (OUTPATIENT)
Dept: ENDOCRINOLOGY | Age: 58
End: 2023-11-01
Payer: MEDICAID

## 2023-11-01 VITALS
DIASTOLIC BLOOD PRESSURE: 75 MMHG | HEART RATE: 86 BPM | HEIGHT: 63 IN | OXYGEN SATURATION: 99 % | RESPIRATION RATE: 18 BRPM | WEIGHT: 198 LBS | BODY MASS INDEX: 35.08 KG/M2 | SYSTOLIC BLOOD PRESSURE: 119 MMHG

## 2023-11-01 DIAGNOSIS — E89.0 POSTOPERATIVE HYPOTHYROIDISM: ICD-10-CM

## 2023-11-01 DIAGNOSIS — C73 THYROID CANCER (HCC): Primary | ICD-10-CM

## 2023-11-01 PROCEDURE — G8427 DOCREV CUR MEDS BY ELIG CLIN: HCPCS | Performed by: INTERNAL MEDICINE

## 2023-11-01 PROCEDURE — 1036F TOBACCO NON-USER: CPT | Performed by: INTERNAL MEDICINE

## 2023-11-01 PROCEDURE — G8417 CALC BMI ABV UP PARAM F/U: HCPCS | Performed by: INTERNAL MEDICINE

## 2023-11-01 PROCEDURE — 99214 OFFICE O/P EST MOD 30 MIN: CPT | Performed by: INTERNAL MEDICINE

## 2023-11-01 PROCEDURE — G8484 FLU IMMUNIZE NO ADMIN: HCPCS | Performed by: INTERNAL MEDICINE

## 2023-11-01 PROCEDURE — 3017F COLORECTAL CA SCREEN DOC REV: CPT | Performed by: INTERNAL MEDICINE

## 2023-11-01 NOTE — PROGRESS NOTES
reviewed the following:  Lab Results   Component Value Date/Time    WBC 5.9 05/15/2023 01:30 PM    RBC 4.86 05/15/2023 01:30 PM    RBC 4.76 03/06/2023 10:48 AM    HGB 14.4 05/15/2023 01:30 PM    HCT 43.7 05/15/2023 01:30 PM    MCV 89.9 05/15/2023 01:30 PM    MCH 29.6 05/15/2023 01:30 PM    MCHC 33.0 05/15/2023 01:30 PM    RDW 13.6 05/15/2023 01:30 PM     05/15/2023 01:30 PM    MPV 10.2 05/15/2023 01:30 PM      Lab Results   Component Value Date/Time     05/15/2023 01:30 PM    K 4.0 05/15/2023 01:30 PM    CO2 26 05/15/2023 01:30 PM    BUN 7 05/15/2023 01:30 PM    CREATININE 0.9 05/15/2023 01:30 PM    CALCIUM 9.5 05/15/2023 01:30 PM    LABGLOM >60 05/15/2023 01:30 PM      Lab Results   Component Value Date/Time    TSH 0.121 (L) 07/12/2023 09:11 AM    T4FREE 2.16 (H) 07/12/2023 09:11 AM    THGAB <0.9 07/12/2023 09:11 AM     Lab Results   Component Value Date/Time    LABA1C 5.6 11/17/2022 08:43 AM    GLUCOSE 94 05/15/2023 01:30 PM    GLUCOSE 89 03/06/2023 10:48 AM     Lab Results   Component Value Date/Time    TRIG 131 11/17/2022 08:43 AM    HDL 73 11/17/2022 08:43 AM    LDLCALC 144 11/17/2022 08:43 AM    CHOL 243 11/17/2022 08:43 AM     No results found for: \"VITD25\"    ASSESSMENT & RECOMMENDATIONS   Vivi Vázquez, a 60 y.o.-old female seen in for following issues     Papillary thyroid cancer   Pt s/p total thyroidectomy on 3/13/2023, PTC 3.5 cm in Lt lobe   S/p QUINTEROS ablation 29.8 mCi in May 2023  The patient is due for blood work now. We will check thyroglobulin level. Thyroid ultrasound October 21, 2023 was negative for any evidence of    Post surgical hypothyroidism:  The patient is currently on levothyroxine 125 mcg daily. We will check TFTs today with goal to keep TSH being 0.3-2.0. I personally reviewed external notes from PCP and other patient's care team providers, and personally interpreted labs associated with the above diagnosis.  I also ordered labs to further assess and manage the above

## 2023-12-19 ENCOUNTER — TELEPHONE (OUTPATIENT)
Dept: ENDOCRINOLOGY | Age: 58
End: 2023-12-19

## 2023-12-22 NOTE — TELEPHONE ENCOUNTER
Pt called regarding labs - stated she recently had labs w/ PCP on 12/11/23, would like TSH levels reviewed.     PCP stated levels were off, may need med adjustment.     Please advise?

## 2023-12-27 ENCOUNTER — TELEPHONE (OUTPATIENT)
Dept: ENDOCRINOLOGY | Age: 58
End: 2023-12-27

## 2023-12-27 DIAGNOSIS — C73 THYROID CANCER (HCC): Primary | ICD-10-CM

## 2023-12-29 RX ORDER — LEVOTHYROXINE SODIUM 0.1 MG/1
100 TABLET ORAL DAILY
Qty: 90 TABLET | Refills: 3 | Status: SHIPPED | OUTPATIENT
Start: 2023-12-29

## 2023-12-29 NOTE — TELEPHONE ENCOUNTER
Thyroid hormones are above goal for her thyroid cancer. I recommend a change levothyroxine from 125 mcg daily to 100 mcg daily and repeat thyroid level in 6 to 8 weeks.

## 2024-01-24 ENCOUNTER — TELEPHONE (OUTPATIENT)
Dept: CASE MANAGEMENT | Age: 59
End: 2024-01-24

## 2024-01-24 NOTE — TELEPHONE ENCOUNTER
I called the patient and she confirmed her CT lung screening at [facility] on 1/25/2024 at 8:30 am.  I reminded the patient to arrive at 8:00 am, enter through the main entrance, and register. Patient confirmed.          Electronically signed by Yovani Lambert on 1/24/24 at 12:29 PM EST

## 2024-01-25 ENCOUNTER — HOSPITAL ENCOUNTER (OUTPATIENT)
Dept: CT IMAGING | Age: 59
Discharge: HOME | End: 2024-01-25
Attending: FAMILY MEDICINE
Payer: MEDICAID

## 2024-01-25 DIAGNOSIS — Z87.891 PERSONAL HISTORY OF TOBACCO USE: ICD-10-CM

## 2024-01-25 PROCEDURE — 71271 CT THORAX LUNG CANCER SCR C-: CPT

## 2024-01-26 ENCOUNTER — TELEPHONE (OUTPATIENT)
Dept: CASE MANAGEMENT | Age: 59
End: 2024-01-26

## 2024-01-26 NOTE — TELEPHONE ENCOUNTER
No call, encounter opened to process CT Lung Screening.    CT Lung Screen: 1/25/2025    IMPRESSION:  1. 2 mm pleural based nodule seen within the right upper lobe and 2 mm  subpleural nodule seen within the left upper lobe.  These findings are stable  unchanged compared to prior study of 12/11/2022.  Follow-up examination is  recommended in 1 year.  2. There is no pulmonary infiltrate or suspicious pulmonary mass.     LUNG RADS:  Lung-RADS 2 - Benign (v2022)     Management:  12 month screening LDCT     RECOMMENDATIONS:  If you would like to register your patient with the [facility] Lung Nodule/Lung  Cancer Screening Program, please contact the Nurse Navigator at  [phone].    Pack years: 30    Social History     Tobacco Use  Smoking Status: Former Smoker    Start Date: 1993   Quit Date: 2023   Types: Cigarettes   Packs/Day: 1   Years: 30   Pack Years: 30    Smokeless Tobacco: Never         Results letter sent to patient via my chart or mailed.     Yovani Lambert  Imaging Navigator   [facility]   [phone]

## 2024-02-25 ENCOUNTER — TELEPHONE (OUTPATIENT)
Dept: ENDOCRINOLOGY | Age: 59
End: 2024-02-25

## 2024-02-25 DIAGNOSIS — C73 THYROID CANCER (HCC): Primary | ICD-10-CM

## 2024-02-25 NOTE — TELEPHONE ENCOUNTER
Notify patient  Your thyroid level is still high.  High thyroid hormone can significantly increase your risk of different arrhythmia especially A-fib.  We will continue slowly decreasing your dose until we get your level at goal.    Please confirm she is currently on levothyroxine 100 mcg daily.  If so I recommend taking 1 tablet 6 days a week and skip Sunday.    Will recheck your level again in 8 weeks    Please update her med list

## 2024-04-23 ENCOUNTER — HOSPITAL ENCOUNTER (OUTPATIENT)
Dept: MAMMOGRAPHY | Age: 59
Discharge: HOME | End: 2024-04-25
Payer: MEDICAID

## 2024-04-23 VITALS — WEIGHT: 181 LBS | HEIGHT: 63 IN | BODY MASS INDEX: 32.07 KG/M2

## 2024-04-23 DIAGNOSIS — Z12.31 VISIT FOR SCREENING MAMMOGRAM: ICD-10-CM

## 2024-04-23 PROCEDURE — 77063 BREAST TOMOSYNTHESIS BI: CPT

## 2024-04-25 ENCOUNTER — OFFICE VISIT (OUTPATIENT)
Dept: ENT CLINIC | Age: 59
End: 2024-04-25
Payer: MEDICAID

## 2024-04-25 VITALS
WEIGHT: 184 LBS | HEIGHT: 63 IN | SYSTOLIC BLOOD PRESSURE: 121 MMHG | HEART RATE: 65 BPM | DIASTOLIC BLOOD PRESSURE: 81 MMHG | BODY MASS INDEX: 32.6 KG/M2

## 2024-04-25 DIAGNOSIS — C73 THYROID CANCER (HCC): Primary | ICD-10-CM

## 2024-04-25 DIAGNOSIS — Q31.8 VOCAL CORD ANOMALY: ICD-10-CM

## 2024-04-25 DIAGNOSIS — R43.2 DISTORTED TASTE: ICD-10-CM

## 2024-04-25 DIAGNOSIS — R49.0 DYSPHONIA: ICD-10-CM

## 2024-04-25 DIAGNOSIS — R49.0 HOARSENESS OF VOICE: ICD-10-CM

## 2024-04-25 DIAGNOSIS — J38.1 VOCAL CORD POLYP: ICD-10-CM

## 2024-04-25 DIAGNOSIS — R13.12 OROPHARYNGEAL DYSPHAGIA: ICD-10-CM

## 2024-04-25 PROCEDURE — 3017F COLORECTAL CA SCREEN DOC REV: CPT | Performed by: OTOLARYNGOLOGY

## 2024-04-25 PROCEDURE — 1036F TOBACCO NON-USER: CPT | Performed by: OTOLARYNGOLOGY

## 2024-04-25 PROCEDURE — G8427 DOCREV CUR MEDS BY ELIG CLIN: HCPCS | Performed by: OTOLARYNGOLOGY

## 2024-04-25 PROCEDURE — 99214 OFFICE O/P EST MOD 30 MIN: CPT | Performed by: OTOLARYNGOLOGY

## 2024-04-25 PROCEDURE — G8417 CALC BMI ABV UP PARAM F/U: HCPCS | Performed by: OTOLARYNGOLOGY

## 2024-04-25 NOTE — PROGRESS NOTES
4/25/24: Pt here for 6 mo follow up thyroid CA. Periodically has discomfort/soreness in her neck and collar bone. Has random sore throats when swallowing but no choking. No change in voice. Drinks water throughout the day, 2 cups of coffee, and currently no alcohol. Weight is currently stable, but is working toward weight loss.  
throughout the day, 2 cups of coffee, and currently no alcohol. Weight is currently stable.    10/24/23: Pt here for 4 mo follow up thyroid CA. US thyroid completed 10/21/23, not resulted yet. No pain, but mentions a periodic Rt sore throat. No difficulty swallowing. States voice is more raspy than normal, but does mention seasonal allergies. Drinks water throughout the day, 2 cups of coffee, and currently no alcohol. Weight is stable.    4/25/24: Pt here for 6 mo follow up thyroid CA. Periodically has discomfort/soreness in her neck and collar bone. Has random sore throats when swallowing but no choking. No change in voice- solid and strong. Drinks water throughout the day, 2 cups of coffee, and currently no alcohol. Weight is currently stable, but is working toward weight loss.    Review of Systems- No drainage, discharge, or headache.  Complete 10 system ROS completed and negative except as noted above.     Physical Examination-   Vital Signs-/81   Pulse 65   Ht 1.6 m (5' 3\")   Wt 83.5 kg (184 lb)   BMI 32.59 kg/m²     Ears- Tympanic membranes clear bilaterally.  No middle ear effusion.  No pre or post auricular tenderness.  Nose- Nasal mucosa clear and dry.  No significant septal deviation or inferior turbinate hypertrophy.  Oral Cavity/Oropharynx- Floor of mouth and tongue are soft and nontender.  No posterior pharyngeal erythema. + gag reflex  Neck- Soft and nontender.  No masses, lesions, lymphadenopathy, or thyroid nodules appreciated.  Incision site is clean, dry and intact with mild edema to superior margin maturing to well healed  Cranial Nerve- Cranial nerves II to XII intact.  Extraocular muscles intact.  No gross motor visual deficits.  No spontaneous nystagmus.    Face- No facial skin tenderness to palpation.  Heart- No cyanosis, regular  Lungs- No stridor, no intercostal accessory muscle use  General- The patient is in no acute distress. A&O x3    Scope 2/2/23  Procedure note- after pt

## 2024-05-02 ENCOUNTER — OFFICE VISIT (OUTPATIENT)
Dept: ENDOCRINOLOGY | Age: 59
End: 2024-05-02
Payer: MEDICAID

## 2024-05-02 VITALS
DIASTOLIC BLOOD PRESSURE: 72 MMHG | OXYGEN SATURATION: 97 % | HEIGHT: 63 IN | WEIGHT: 184 LBS | BODY MASS INDEX: 32.6 KG/M2 | HEART RATE: 92 BPM | SYSTOLIC BLOOD PRESSURE: 117 MMHG

## 2024-05-02 DIAGNOSIS — E89.0 POSTOPERATIVE HYPOTHYROIDISM: Primary | ICD-10-CM

## 2024-05-02 DIAGNOSIS — C73 THYROID CANCER (HCC): ICD-10-CM

## 2024-05-02 PROCEDURE — 99214 OFFICE O/P EST MOD 30 MIN: CPT | Performed by: INTERNAL MEDICINE

## 2024-05-02 PROCEDURE — G8427 DOCREV CUR MEDS BY ELIG CLIN: HCPCS | Performed by: INTERNAL MEDICINE

## 2024-05-02 PROCEDURE — G8417 CALC BMI ABV UP PARAM F/U: HCPCS | Performed by: INTERNAL MEDICINE

## 2024-05-02 PROCEDURE — 3017F COLORECTAL CA SCREEN DOC REV: CPT | Performed by: INTERNAL MEDICINE

## 2024-05-02 PROCEDURE — 1036F TOBACCO NON-USER: CPT | Performed by: INTERNAL MEDICINE

## 2024-05-02 RX ORDER — LEVOTHYROXINE SODIUM 0.1 MG/1
TABLET ORAL
Qty: 90 TABLET | Refills: 3 | Status: SHIPPED | OUTPATIENT
Start: 2024-05-02

## 2024-05-02 NOTE — PROGRESS NOTES
head or neck region.    PAST MEDICAL HISTORY   Past Medical History:   Diagnosis Date    Allergies     Thyroid cancer (HCC)        PAST SURGICAL HISTORY   Past Surgical History:   Procedure Laterality Date    APPENDECTOMY  1978    LARYNGOSCOPY N/A 3/13/2023    EXCISION OF VOCAL CORD POLYP performed by Jimy Singh MD at [facility]    THYROIDECTOMY Bilateral 3/13/2023    THYROIDECTOMY performed by Jimy Singh MD at [facility]       SOCIAL HISTORY   Tobacco:   reports that she quit smoking about 14 months ago. Her smoking use included cigarettes. She started smoking about 31 years ago. She has a 30.0 pack-year smoking history. She has been exposed to tobacco smoke. She has never used smokeless tobacco.  Alcohol:   reports current alcohol use.  Drugs:   reports no history of drug use.    FAMILY HISTORY   Family History   Problem Relation Age of Onset    Atrial Fibrillation Mother     Prostate Cancer Father 80    Colon Cancer Paternal Uncle        ALLERGIES AND DRUG REACTIONS   Allergies   Allergen Reactions    Seasonal        CURRENT MEDICATIONS   Current Outpatient Medications   Medication Sig Dispense Refill    levothyroxine (SYNTHROID) 100 MCG tablet Take 1 tablet 6 days a wk and none on Sunday 90 tablet 3    Red Yeast Rice 600 MG CAPS Take by mouth daily      famotidine (PEPCID) 20 MG tablet Take 1 tablet by mouth nightly as needed (heartburn) 90 tablet 1    rosuvastatin (CRESTOR) 5 MG tablet Take 1 tablet by mouth at bedtime 90 tablet 1    Acetaminophen (TYLENOL 8 HOUR PO) Take by mouth      vitamin D (CHOLECALCIFEROL) 125 MCG (5000 UT) CAPS capsule Take 1 capsule by mouth daily      Ascorbic Acid (VITAMIN C) 1000 MG tablet Take 1 tablet by mouth daily      Calcium-Magnesium-Zinc 333-133-5 MG TABS Take by mouth daily      Potassium 95 MG TABS Take by mouth daily      zinc 50 MG TABS tablet Take 1 tablet by mouth daily      Cyanocobalamin (B-12) 5000 MCG CAPS Take by mouth daily      Multiple Vitamins-Minerals

## 2024-08-08 ENCOUNTER — HOSPITAL ENCOUNTER (OUTPATIENT)
Dept: ULTRASOUND IMAGING | Age: 59
Discharge: HOME | End: 2024-08-08
Payer: MEDICAID

## 2024-08-08 DIAGNOSIS — C73 THYROID CANCER (HCC): ICD-10-CM

## 2024-08-08 PROCEDURE — 76536 US EXAM OF HEAD AND NECK: CPT

## 2024-08-13 ENCOUNTER — TELEPHONE (OUTPATIENT)
Dept: ENDOCRINOLOGY | Age: 59
End: 2024-08-13

## 2024-08-13 NOTE — TELEPHONE ENCOUNTER
----- Message from Kip PINTO sent at 8/12/2024  8:43 AM EDT -----  Please call patient and inform her thyroid ultrasound was negative.  This is an excellent result

## 2024-11-07 ENCOUNTER — OFFICE VISIT (OUTPATIENT)
Dept: ENDOCRINOLOGY | Age: 59
End: 2024-11-07

## 2024-11-07 VITALS
WEIGHT: 186 LBS | HEIGHT: 63 IN | SYSTOLIC BLOOD PRESSURE: 133 MMHG | OXYGEN SATURATION: 96 % | HEART RATE: 64 BPM | BODY MASS INDEX: 32.96 KG/M2 | DIASTOLIC BLOOD PRESSURE: 87 MMHG

## 2024-11-07 DIAGNOSIS — C73 THYROID CANCER (HCC): Primary | ICD-10-CM

## 2024-11-07 DIAGNOSIS — E89.0 POSTOPERATIVE HYPOTHYROIDISM: ICD-10-CM

## 2024-11-07 RX ORDER — LEVOTHYROXINE SODIUM 100 UG/1
TABLET ORAL
Qty: 90 TABLET | Refills: 3 | Status: SHIPPED | OUTPATIENT
Start: 2024-11-07

## 2024-11-07 NOTE — PROGRESS NOTES
murmur, no heave. Dorsalis pedis pulse palpable   Abd: soft lax, no tenderness, no organomegaly, audible bowel sounds.  Skin: warm, no lesions, no rash  MSK: No local spine tenderness   Neuro: CN intact, sensation notmal , muscle power normal. No tremors   Psych: normal mood, and affect    Review of Laboratory Data:  I have reviewed the following:  Lab Results   Component Value Date/Time    WBC 5.7 12/11/2023 08:25 AM    RBC 4.53 12/11/2023 08:25 AM    RBC 4.76 03/06/2023 10:48 AM    HGB 13.5 12/11/2023 08:25 AM    HCT 41.1 12/11/2023 08:25 AM    MCV 90.7 12/11/2023 08:25 AM    MCH 29.8 12/11/2023 08:25 AM    MCHC 32.8 12/11/2023 08:25 AM    RDW 13.4 12/11/2023 08:25 AM     12/11/2023 08:25 AM    MPV 10.7 12/11/2023 08:25 AM      Lab Results   Component Value Date/Time     06/06/2024 08:10 AM    K 4.5 06/06/2024 08:10 AM    CO2 20 (L) 06/06/2024 08:10 AM    BUN 19 06/06/2024 08:10 AM    CREATININE 0.8 06/06/2024 08:10 AM    CALCIUM 8.8 06/06/2024 08:10 AM    LABGLOM 81 06/06/2024 08:10 AM    LABGLOM >60 12/11/2023 08:25 AM      Lab Results   Component Value Date/Time    TSH 4.94 (H) 10/17/2024 10:04 AM    T4FREE 1.3 10/17/2024 10:04 AM     Lab Results   Component Value Date/Time    LABA1C 5.3 12/11/2023 08:25 AM    GLUCOSE 96 06/06/2024 08:10 AM    GLUCOSE 89 03/06/2023 10:48 AM     Lab Results   Component Value Date/Time    TRIG 58 06/06/2024 08:10 AM    HDL 79 06/06/2024 08:10 AM    CHOL 180 06/06/2024 08:10 AM     Lab Results   Component Value Date/Time    VITD25 51.4 12/11/2023 08:25 AM       ASSESSMENT & RECOMMENDATIONS   Laura Krishnan, a 59 y.o.-old female seen in for following issues     Papillary thyroid cancer   Pt s/p total thyroidectomy on 3/13/2023, PTC 3.5 cm in Lt lobe   S/p QUINTEROS ablation 29.8 mCi in May 2023  The patient is due for blood work now.  We will check thyroglobulin level.  Thyroid ultrasound  was negative for any evidence of    Post surgical hypothyroidism:  Will increase

## 2024-12-20 ENCOUNTER — TELEPHONE (OUTPATIENT)
Dept: ENDOCRINOLOGY | Age: 59
End: 2024-12-20

## 2024-12-20 DIAGNOSIS — C73 THYROID CANCER (HCC): Primary | ICD-10-CM

## 2024-12-20 DIAGNOSIS — E89.0 POSTOPERATIVE HYPOTHYROIDISM: ICD-10-CM

## 2024-12-20 RX ORDER — LEVOTHYROXINE SODIUM 125 UG/1
125 TABLET ORAL DAILY
Qty: 30 TABLET | Refills: 5 | Status: SHIPPED | OUTPATIENT
Start: 2024-12-20

## 2024-12-20 NOTE — TELEPHONE ENCOUNTER
Notify patient  Thyroid hormones are below goal for your thyroid cancer.  Please confirm she is currently on levothyroxine 100 mcg 1 tablet daily.  If so I recommend change to dose to 125 mcg daily and recheck thyroid level again in 6 to 8 weeks

## 2025-01-24 ENCOUNTER — TELEPHONE (OUTPATIENT)
Dept: ENDOCRINOLOGY | Age: 60
End: 2025-01-24

## 2025-01-24 DIAGNOSIS — E89.0 POSTOPERATIVE HYPOTHYROIDISM: Primary | ICD-10-CM

## 2025-01-27 ENCOUNTER — HOSPITAL ENCOUNTER (OUTPATIENT)
Dept: CT IMAGING | Age: 60
Discharge: HOME | End: 2025-01-27
Attending: FAMILY MEDICINE
Payer: MEDICAID

## 2025-01-27 DIAGNOSIS — Z87.891 PERSONAL HISTORY OF TOBACCO USE: ICD-10-CM

## 2025-01-27 PROCEDURE — 71271 CT THORAX LUNG CANCER SCR C-: CPT

## 2025-01-28 NOTE — TELEPHONE ENCOUNTER
Thyroid hormones are much better now.  I recommend slight adjustment of thyroid medicine from levothyroxine 125 mcg daily to 1 tablet 6 days a week and half tablet on Sunday.      Please order TSH and free T4 to be done few days before her next appointment in May    Please update her med list to match the new regimen

## 2025-04-24 ENCOUNTER — HOSPITAL ENCOUNTER (OUTPATIENT)
Dept: MAMMOGRAPHY | Age: 60
Discharge: HOME | End: 2025-04-26
Payer: MEDICAID

## 2025-04-24 VITALS — HEIGHT: 63 IN | WEIGHT: 189 LBS | BODY MASS INDEX: 33.49 KG/M2

## 2025-04-24 DIAGNOSIS — Z12.31 ENCOUNTER FOR SCREENING MAMMOGRAM FOR MALIGNANT NEOPLASM OF BREAST: ICD-10-CM

## 2025-04-24 PROCEDURE — 77063 BREAST TOMOSYNTHESIS BI: CPT

## 2025-04-28 ENCOUNTER — CLINICAL DOCUMENTATION (OUTPATIENT)
Dept: MAMMOGRAPHY | Age: 60
End: 2025-04-28

## 2025-04-28 NOTE — PROGRESS NOTES
Self referral mammogram clinical report being sent to patient, placed in outgoing mail today. Clinical report also faxed to Dr. Cleveland per signed patient authorization release of mammogram results form. Successful fax confirmation and scanned into media. DARÍO NgoN, RN - Breast Navigator

## 2025-04-30 ENCOUNTER — RESULTS FOLLOW-UP (OUTPATIENT)
Dept: ENDOCRINOLOGY | Age: 60
End: 2025-04-30

## 2025-04-30 DIAGNOSIS — C73 THYROID CANCER (HCC): ICD-10-CM

## 2025-04-30 DIAGNOSIS — E89.0 POSTOPERATIVE HYPOTHYROIDISM: ICD-10-CM

## 2025-04-30 RX ORDER — LEVOTHYROXINE SODIUM 125 UG/1
125 TABLET ORAL DAILY
Qty: 90 TABLET | Refills: 3 | Status: SHIPPED | OUTPATIENT
Start: 2025-04-30 | End: 2025-05-08 | Stop reason: SDUPTHER

## 2025-05-08 ENCOUNTER — OFFICE VISIT (OUTPATIENT)
Dept: ENDOCRINOLOGY | Age: 60
End: 2025-05-08
Payer: MEDICAID

## 2025-05-08 VITALS
OXYGEN SATURATION: 97 % | RESPIRATION RATE: 18 BRPM | SYSTOLIC BLOOD PRESSURE: 144 MMHG | WEIGHT: 192 LBS | TEMPERATURE: 98.4 F | HEART RATE: 69 BPM | HEIGHT: 62 IN | DIASTOLIC BLOOD PRESSURE: 84 MMHG | BODY MASS INDEX: 35.33 KG/M2

## 2025-05-08 DIAGNOSIS — E55.9 VITAMIN D DEFICIENCY: ICD-10-CM

## 2025-05-08 DIAGNOSIS — C73 THYROID CANCER (HCC): Primary | ICD-10-CM

## 2025-05-08 DIAGNOSIS — E89.0 POSTOPERATIVE HYPOTHYROIDISM: ICD-10-CM

## 2025-05-08 PROCEDURE — G8427 DOCREV CUR MEDS BY ELIG CLIN: HCPCS | Performed by: INTERNAL MEDICINE

## 2025-05-08 PROCEDURE — G8417 CALC BMI ABV UP PARAM F/U: HCPCS | Performed by: INTERNAL MEDICINE

## 2025-05-08 PROCEDURE — 3017F COLORECTAL CA SCREEN DOC REV: CPT | Performed by: INTERNAL MEDICINE

## 2025-05-08 PROCEDURE — 1036F TOBACCO NON-USER: CPT | Performed by: INTERNAL MEDICINE

## 2025-05-08 PROCEDURE — 99214 OFFICE O/P EST MOD 30 MIN: CPT | Performed by: INTERNAL MEDICINE

## 2025-05-08 PROCEDURE — G2211 COMPLEX E/M VISIT ADD ON: HCPCS | Performed by: INTERNAL MEDICINE

## 2025-05-08 RX ORDER — LEVOTHYROXINE SODIUM 125 UG/1
TABLET ORAL
Qty: 90 TABLET | Refills: 3 | Status: SHIPPED | OUTPATIENT
Start: 2025-05-08

## 2025-05-08 NOTE — PROGRESS NOTES
[facility] Department of Endocrinology Diabetes and Metabolism   [address] [place] [id number]   Phone: [phone]  Fax: [phone]    Date of Service: 5/8/2025  Primary Care Physician: Terell Miller DO  Referring physician: No ref. provider found   Provider: Zach Infante MD     Reason for the visit:  Papillary thyroid cancer postsurgical hypothyroidism    History of Present Illness:  The history is provided by the patient. No  was used. Accuracy of the patient data is excellent.    Laura Krishnan is a very pleasant 59 y.o. female seen today for management of thyroid cancer     The patient underwent total thyroidectomy on 3/13/2023. Papillary thyroid cancer   Pathology report:  Tumor focality: Unifocal   Tumor site: Left lobe   Tumor size: Greatest dimension - 3.5 cm   Histologic type: Papillary carcinoma, classic (usual, conventional)   Angioinvasion: Not identified   Lymphatic invasion: Not identified   Perithyroidal extension: Not identified   Margin status: All margins negative for carcinoma.  Distance from   invasive carcinoma to closest margin: <0.1 cm (inked anterior surface).   Regional lymph node status: Not applicable (no regional lymph nodes   submitted or found)   Distant metastasis: Not applicable   Pathologic Stage Classification (pTNM, AJCC eighth edition):    pT Category:  pT2    pN Category:  pN not assigned (no nodes submitted or found)   Additional findings: No parathyroid tissue seen.     The patient received 29.8 mCi of I-131 radioactive iodine ablation on May 19, 2023.  Therapy scan was negative for any distant metastasis.    Blood work in October 2024 showed TG level of 0.1.  TG level was also very low in December 2023.    The patient currently on levothyroxine 125 mcg 1 tab 6 days a wee and half tablet on Sunday    Lab Results   Component Value Date/Time    TSH 0.19 (L) 04/28/2025 11:06 AM    T4FREE 1.6 04/28/2025 11:06 AM     Tg

## (undated) DEVICE — 3M™ TEGADERM™ TRANSPARENT FILM DRESSING FRAME STYLE, 1626W, 4 IN X 4-3/4 IN (10 CM X 12 CM), 50/CT 4CT/CASE: Brand: 3M™ TEGADERM™

## (undated) DEVICE — STRIP,CLOSURE,WOUND,MEDI-STRIP,1/2X4: Brand: MEDLINE

## (undated) DEVICE — SPONGE,PEANUT,XRAY,ST,SM,3/8",5/CARD: Brand: MEDLINE INDUSTRIES, INC.

## (undated) DEVICE — CORD,CAUTERY,BIPOLAR,STERILE: Brand: MEDLINE

## (undated) DEVICE — MARKER SURG SKIN FULL SZ PUR TRAD INK REGULAR ULTRA FN TWIN

## (undated) DEVICE — GLOVE ORANGE PI 7 1/2   MSG9075

## (undated) DEVICE — HEAD AND NECK: Brand: MEDLINE INDUSTRIES, INC.

## (undated) DEVICE — KIT,ANTI FOG,W/SPONGE & FLUID,SOFT PACK: Brand: MEDLINE

## (undated) DEVICE — E-Z CLEAN, NON-STICK, PTFE COATED, ELECTROSURGICAL BLADE ELECTRODE, MODIFIED EXTENDED INSULATION, 2.5 INCH (6.35 CM): Brand: MEGADYNE

## (undated) DEVICE — GLOVE SURG SZ 65 THK91MIL LTX FREE SYN POLYISOPRENE

## (undated) DEVICE — PAPER FILTER 1 32CM

## (undated) DEVICE — BAG PRSS INFUS IV OR ART 3000 CC W STPCOCK NO THUMBWHEEL W

## (undated) DEVICE — Device

## (undated) DEVICE — TOWEL,OR,DSP,ST,BLUE,STD,6/PK,12PK/CS: Brand: MEDLINE

## (undated) DEVICE — HOOK RETRCT 12MM S STL BLNT E STAY LONE STAR

## (undated) DEVICE — PROBE 8225101 5PK STD PRASS FL TIP ROHS

## (undated) DEVICE — OPTIFOAM GENTLE EX, SACRUM, 9X9: Brand: MEDLINE

## (undated) DEVICE — KIT SURG W7XL11IN 2 PKT UNTREATED NA

## (undated) DEVICE — MASTISOL ADHESIVE LIQ 2/3ML